# Patient Record
Sex: FEMALE | Employment: OTHER
[De-identification: names, ages, dates, MRNs, and addresses within clinical notes are randomized per-mention and may not be internally consistent; named-entity substitution may affect disease eponyms.]

---

## 2017-01-03 ENCOUNTER — HOSPITAL ENCOUNTER (OUTPATIENT)
Dept: PHYSICAL THERAPY | Age: 52
Setting detail: THERAPIES SERIES
Discharge: STILL ADMITTED OR ADMINISTRATIVE | End: 2017-01-03
Attending: NURSE PRACTITIONER

## 2017-01-03 DIAGNOSIS — R42 DIZZINESS: ICD-10-CM

## 2017-01-03 DIAGNOSIS — R26.89 UNSTABLE BALANCE: ICD-10-CM

## 2017-01-12 ENCOUNTER — HOSPITAL ENCOUNTER (OUTPATIENT)
Dept: PHYSICAL THERAPY | Age: 52
Setting detail: THERAPIES SERIES
Discharge: STILL ADMITTED OR ADMINISTRATIVE | End: 2017-01-12
Attending: NURSE PRACTITIONER

## 2017-01-12 DIAGNOSIS — R26.89 UNSTABLE BALANCE: ICD-10-CM

## 2017-01-12 DIAGNOSIS — R42 DIZZINESS: ICD-10-CM

## 2017-10-24 NOTE — TELEPHONE ENCOUNTER
"APPT INFO    Date /Time: 11/2/17 5:30pm   Reason for Appt: L Hip Pain   Ref Provider/Clinic: self   Patient Contact (Y/N) & Call Details: No - pt is returning to provider   Action: --     RECORDS CLINIC NAME  (\"None\" if no records ) RECEIVED RECS & IMG? Y/N   (may include other helpful notes)   Internal Clinics: [facility] Records and imaging in epic/pacs with Dr. Paiz. Pt was seen previously for RIGHT Hip.    Rt Hip Arthroscopy 1/3/11 in [facility] Records in [facility] with Dr. Dodd in 2013    [facility] PT notes in [facility]   External Clinics: none        "

## 2017-11-02 ENCOUNTER — PRE VISIT (OUTPATIENT)
Dept: ORTHOPEDICS | Facility: CLINIC | Age: 52
End: 2017-11-02

## 2017-12-05 ASSESSMENT — ENCOUNTER SYMPTOMS
MUSCLE WEAKNESS: 0
EYE IRRITATION: 1
ARTHRALGIAS: 0
STIFFNESS: 1
BACK PAIN: 1
EYE REDNESS: 1
EYE PAIN: 0
MUSCLE CRAMPS: 0
MYALGIAS: 1
JOINT SWELLING: 0
DOUBLE VISION: 0
EYE WATERING: 0
NECK PAIN: 0

## 2017-12-05 ASSESSMENT — ACTIVITIES OF DAILY LIVING (ADL)
ADL_SUM: 27
ADL_SUBSCALE_SCORE: 60.29
ADL_MEAN: 1.58

## 2017-12-05 ASSESSMENT — HOOS S4: HOW SEVERE IS YOUR HIP JOINT STIFFNESS AFTER FIRST WAKENING IN THE MORNING?: MODERATE

## 2017-12-08 DIAGNOSIS — M25.552 HIP PAIN, LEFT: Primary | ICD-10-CM

## 2017-12-14 ENCOUNTER — RADIANT APPOINTMENT (OUTPATIENT)
Dept: GENERAL RADIOLOGY | Facility: CLINIC | Age: 52
End: 2017-12-14
Attending: ORTHOPAEDIC SURGERY
Payer: COMMERCIAL

## 2017-12-14 ENCOUNTER — OFFICE VISIT (OUTPATIENT)
Dept: ORTHOPEDICS | Facility: CLINIC | Age: 52
End: 2017-12-14
Payer: COMMERCIAL

## 2017-12-14 VITALS — BODY MASS INDEX: 25.9 KG/M2 | HEIGHT: 67 IN | WEIGHT: 165.01 LBS

## 2017-12-14 DIAGNOSIS — M25.552 HIP PAIN, LEFT: ICD-10-CM

## 2017-12-14 DIAGNOSIS — M25.552 HIP PAIN, LEFT: Primary | ICD-10-CM

## 2017-12-14 RX ORDER — CITALOPRAM HYDROBROMIDE 20 MG/1
TABLET ORAL
COMMUNITY
Start: 2015-10-01

## 2017-12-14 NOTE — MR AVS SNAPSHOT
"              After Visit Summary   12/14/2017    Iman Lee    MRN: [id number]           Patient Information     Date Of Birth          1965        Visit Information        Provider Department      12/14/2017 1:30 PM Stas Paiz MD [facility]        Today's Diagnoses     Hip pain, left    -  1       Follow-ups after your visit        Who to contact     Please call your clinic at [phone] to:    Ask questions about your health    Make or cancel appointments    Discuss your medicines    Learn about your test results    Speak to your doctor   If you have compliments or concerns about an experience at your clinic, or if you wish to file a complaint, please contact [facility] Patient Relations at [phone] or email us at [email or web address]         Additional Information About Your Visit        MyChart Information     [facility] gives you secure access to your electronic health record. If you see a primary care provider, you can also send messages to your care team and make appointments. If you have questions, please call your primary care clinic.  If you do not have a primary care provider, please call [phone] and they will assist you.      [employer] is an electronic gateway that provides easy, online access to your medical records. With [employer], you can request a clinic appointment, read your test results, renew a prescription or communicate with your care team.     To access your existing account, please contact your [facility] or call [phone] for assistance.        Care EveryWhere ID     This is your Care EveryWhere ID. This could be used by other organizations to access your [facility] medical records  [id number]        Your Vitals Were     Height BMI (Body Mass Index)                1.702 m (5' 7\") 25.84 kg/m2           Blood Pressure from Last 3 Encounters:   No data found for BP    Weight from " Last 3 Encounters:   12/14/17 74.8 kg (165 lb 0.2 oz)   06/28/13 72.6 kg (160 lb)              Today, you had the following     No orders found for display       Primary Care Provider Office Phone # Fax #    Dayan Durham [phone]       [facility] [address]  [facility] [address]        Equal Access to Services     MARELY [facility] : Hadii aad ku hadasho Soomaali, waaxda luqadaha, qaybta kaalmada adeegyada, waxay idiin hayaan adeeg khseblesh laApaan . So [facility] [phone].    ATENCIÓN: Si habla espasde, tiene a dietz disposición servicios gratuitos de asistencia lingüística. Llame al [phone].    We comply with applicable federal civil rights laws and [place] laws. We do not discriminate on the basis of race, color, national origin, age, disability, sex, sexual orientation, or gender identity.            Thank you!     Thank you for choosing [facility]  for your care. Our goal is always to provide you with excellent care. Hearing back from our patients is one way we can continue to improve our services. Please take a few minutes to complete the written survey that you may receive in the mail after your visit with us. Thank you!             Your Updated Medication List - Protect others around you: Learn how to safely use, store and throw away your medicines at www.disposemymeds.org.          This list is accurate as of: 12/14/17  2:05 PM.  Always use your most recent med list.                   Brand Name Dispense Instructions for use Diagnosis    citalopram 20 MG tablet    celeXA          IBUPROFEN PO           traZODone 25 mg Tabs half-tab    DESYREL     Take 150 mg by mouth At Bedtime.

## 2017-12-14 NOTE — NURSING NOTE
[facility]  [address] [place] [id number]  Dept: [phone]  ______________________________________________________________________________    Patient: Iman Lee   : 1965   MRN: [id number]   2017    INVASIVE PROCEDURE SAFETY CHECKLIST    Date: 2017   Procedure: Left Hip Cortisone Injection  Patient Name: Iman Lee  MRN: [id number]  YOB: 1965    Action: Complete sections as appropriate. Any discrepancy results in a HARD COPY until resolved.     PRE PROCEDURE:  Patient ID verified with 2 identifiers (name and  or MRN): Yes  Procedure and site verified with patient/designee (when able): Yes  Accurate consent documentation in medical record: Yes  H&P (or appropriate assessment) documented in medical record: Yes  H&P must be up to 20 days prior to procedure and updates within 24 hours of procedure as applicable: Yes  Relevant diagnostic and radiology test results appropriately labeled and displayed as applicable: Yes  Procedure site(s) marked with provider initials: Yes    TIMEOUT:  Time-Out performed immediately prior to starting procedure, including verbal and active participation of all team members addressing the following:Yes  * Correct patient identify  * Confirmed that the correct side and site are marked  * An accurate procedure consent form  * Agreement on the procedure to be done  * Correct patient position  * Relevant images and results are properly labeled and appropriately displayed  * The need to administer antibiotics or fluids for irrigation purposes during the procedure as applicable   * Safety precautions based on patient history or medication use    DURING PROCEDURE: Verification of correct person, site, and procedures any time the responsibility for care of the patient is transferred to another member of the care team.         The following medication was given:     MEDICATION:  Kenalog 40  mg  ROUTE: IM  SITE: Left Hip  DOSE: 40mg, 1cc  LOT #: [id number]  : [employer]  EXPIRATION DATE: APR 2019  NDC#: 3561-2734-33   Was there drug waste? No      Emily Garner MA  December 14, 2017    The following medication was given:     MEDICATION:  Lidocaine without epinephrine  ROUTE: IM  SITE: Left Hip   DOSE: 6cc  LOT #: [id number]  : [employer]  EXPIRATION DATE: 09/21  NDC#: 91657-512-22  Was there drug waste? Yes  Amount of drug waste (mL): 14.  Reason for waste:  As per MD Emily Garner MA  December 14, 2017

## 2017-12-14 NOTE — PROGRESS NOTES
"Chief Complaint: Consult (Pt. states that she is here today for Left Hip Pain, started since Summer 2017. She mention that she did some excerise that had cause the pain. HX: Right Hip Arthroscopy. DOS: 01/03/2011.)     HPI: Iman Lee returns today to discuss her left hip. She is 6 years s/p labral repair and cam correction on the contra-lateral side. She reports that she is here because her left hip is\"really painful\" and has been for about 6 months. She reports difficulty with ADLs. She is taking ibuprofen, has changed her activities, and is doing a home exercise program. She associates this with doing \"sumo\" squats. Also with groin pain but currneltly not near the lateral symptoms in terms of severity.     Medications and allergies are documented in the EMR and have been reviewed.      Current Outpatient Prescriptions:      citalopram (CELEXA) 20 MG tablet, , Disp: , Rfl:      IBUPROFEN PO, , Disp: , Rfl:      traZODone (DESYREL) 25 MG TABS, Take 150 mg by mouth At Bedtime., Disp: , Rfl:     Allergies: Erythromycin; Iodine; Penicillins; and Percocet [oxycodone-acetaminophen]    Current Status:  Results of the patient s Hip Disability and Osteoarthritis Outcome Score (HOOS)  are as follows (0-100 scales with 100 being the theoretical best):  Pain:  60  Symptom:65  ADLS: 60.29  Sports: 31.25  Quality of life:   43.75  (http://koos.nu/)     Physical Exam:  On physical examination the patient appears the stated age, is in no acute distress, affect is appropriate, and breathing is non-labored.    Ht 1.702 m (5' 7\")  Wt 74.8 kg (165 lb 0.2 oz)  BMI 25.84 kg/m2  Body mass index is 25.84 kg/(m^2).    Gait: mild Trendelenburg on the left   Gains exam table: easily   ROM:    Abduction:   Adduction:  Flexion: 100  IRF: 10  ERF: 35  TTP at the greater trochanter and this reproduces her lateral symptoms  An anterior impingement test reproduces her groin pain-- her groin pain is currently a smaller percentage of her " "symptoms.     Distally, the circulatory, motor, and sensation exam is intact with 5/5 EHL, gastroc-soleus, and tibialis anterior.  Sensation to light touch is intact.  Dorsalis pedis and posterior tibialis pulses are palpable.  There are no sores on the feet, no bruising, and no lymphedema.    Assessment: two issues\": intra-articular hip and lateral hip at the greater trochanter. Similar to the pattern that she has been treated for on the right. Today her lateral symptoms are the worst and we discussed the options for his including physical therapy and injection.     Plan: left hip greater trochanter injection    Referred Self      Procedure Note:    After informed consent was obtained the patient's left greater trochanter bursae was injected with 5 cc of lidocaine and 40 mg of kenolog using a superior-medial approach.  Sterile technique was used.  The patient tolerated the procedure well.      Answers for HPI/ROS submitted by the patient on 12/5/2017   General Symptoms: No  Skin Symptoms: No  HENT Symptoms: No  EYE SYMPTOMS: Yes  HEART SYMPTOMS: No  LUNG SYMPTOMS: No  INTESTINAL SYMPTOMS: No  URINARY SYMPTOMS: No  GYNECOLOGIC SYMPTOMS: No  BREAST SYMPTOMS: No  SKELETAL SYMPTOMS: Yes  BLOOD SYMPTOMS: No  NERVOUS SYSTEM SYMPTOMS: No  MENTAL HEALTH SYMPTOMS: No  Eye pain: No  Vision loss: No  Dry eyes: Yes  Watery eyes: No  Eye bulging: No  Double vision: No  Spots: No  Floaters: No  Redness: Yes  Crossed eyes: No  Tunnel Vision: No  Yellowing of eyes: No  Eye irritation: Yes  Back pain: Yes  Muscle aches: Yes  Neck pain: No  Swollen joints: No  Joint pain: No  Bone pain: No  Muscle cramps: No  Muscle weakness: No  Joint stiffness: Yes  Bone fracture: No    "

## 2017-12-14 NOTE — NURSING NOTE
"Reason For Visit:   Chief Complaint   Patient presents with     Consult     Pt. states that she is here today for Left Hip Pain, started since Summer 2017. She mention that she did some excerise that had cause the pain. HX: Right Hip Arthroscopy. DOS: 01/03/2011.       Pain Assessment  Patient Currently in Pain: Yes  0-10 Pain Scale: 5  Primary Pain Location: Hip  Pain Orientation: Left  Pain Descriptors: Discomfort  Alleviating Factors: NSAIDS, Rest  Aggravating Factors: Movement, Walking, Standing               HEIGHT: 5' 7\", WEIGHT: 165 lbs .16 oz, BMI: Body mass index is 25.84 kg/(m^2).      Current Outpatient Prescriptions   Medication Sig Dispense Refill     citalopram (CELEXA) 20 MG tablet        IBUPROFEN PO        traZODone (DESYREL) 25 MG TABS Take 150 mg by mouth At Bedtime.            Allergies   Allergen Reactions     Erythromycin      Iodine      Penicillins      Percocet [Oxycodone-Acetaminophen]                "

## 2017-12-14 NOTE — LETTER
"12/14/2017       RE: Iman Lee  [address]  [place] [address]     Dear Colleague,    Thank you for referring your patient, Iman Lee, to the [facility] at [facility]. Please see a copy of my visit note below.    Chief Complaint: Consult (Pt. states that she is here today for Left Hip Pain, started since Summer 2017. She mention that she did some excerise that had cause the pain. HX: Right Hip Arthroscopy. DOS: 01/03/2011.)     HPI: Iman Lee returns today to discuss her left hip. She is 6 years s/p labral repair and cam correction on the contra-lateral side. She reports that she is here because her left hip is\"really painful\" and has been for about 6 months. She reports difficulty with ADLs. She is taking ibuprofen, has changed her activities, and is doing a home exercise program. She associates this with doing \"sumo\" squats. Also with groin pain but currneltly not near the lateral symptoms in terms of severity.     Medications and allergies are documented in the EMR and have been reviewed.      Current Outpatient Prescriptions:      citalopram (CELEXA) 20 MG tablet, , Disp: , Rfl:      IBUPROFEN PO, , Disp: , Rfl:      traZODone (DESYREL) 25 MG TABS, Take 150 mg by mouth At Bedtime., Disp: , Rfl:     Allergies: Erythromycin; Iodine; Penicillins; and Percocet [oxycodone-acetaminophen]    Current Status:  Results of the patient s Hip Disability and Osteoarthritis Outcome Score (HOOS)  are as follows (0-100 scales with 100 being the theoretical best):  Pain:  60  Symptom:65  ADLS: 60.29  Sports: 31.25  Quality of life:   43.75  (http://koos.nu/)     Physical Exam:  On physical examination the patient appears the stated age, is in no acute distress, affect is appropriate, and breathing is non-labored.    Ht 1.702 m (5' 7\")  Wt 74.8 kg (165 lb 0.2 oz)  BMI 25.84 kg/m2  Body mass index is 25.84 kg/(m^2).    Gait: mild " "Trendelenburg on the left   Gains exam table: easily   ROM:    Abduction:   Adduction:  Flexion: 100  IRF: 10  ERF: 35  TTP at the greater trochanter and this reproduces her lateral symptoms  An anterior impingement test reproduces her groin pain-- her groin pain is currently a smaller percentage of her symptoms.     Distally, the circulatory, motor, and sensation exam is intact with 5/5 EHL, gastroc-soleus, and tibialis anterior.  Sensation to light touch is intact.  Dorsalis pedis and posterior tibialis pulses are palpable.  There are no sores on the feet, no bruising, and no lymphedema.    Assessment: two issues\": intra-articular hip and lateral hip at the greater trochanter. Similar to the pattern that she has been treated for on the right. Today her lateral symptoms are the worst and we discussed the options for his including physical therapy and injection.     Plan: left hip greater trochanter injection    Referred Self      Procedure Note:    After informed consent was obtained the patient's left greater trochanter bursae was injected with 5 cc of lidocaine and 40 mg of kenolog using a superior-medial approach.  Sterile technique was used.  The patient tolerated the procedure well.    Sincerely,    Stas Paiz MD  "

## 2017-12-16 ENCOUNTER — HEALTH MAINTENANCE LETTER (OUTPATIENT)
Age: 52
End: 2017-12-16

## 2019-02-22 ENCOUNTER — THERAPY VISIT (OUTPATIENT)
Dept: PHYSICAL THERAPY | Facility: CLINIC | Age: 54
End: 2019-02-22
Payer: COMMERCIAL

## 2019-02-22 DIAGNOSIS — M99.05 SOMATIC DYSFUNCTION OF PELVIC REGION: ICD-10-CM

## 2019-02-22 DIAGNOSIS — N81.11 CYSTOCELE, MIDLINE: ICD-10-CM

## 2019-02-22 PROCEDURE — 97161 PT EVAL LOW COMPLEX 20 MIN: CPT | Mod: GP | Performed by: PHYSICAL THERAPIST

## 2019-02-22 PROCEDURE — 97112 NEUROMUSCULAR REEDUCATION: CPT | Mod: GP | Performed by: PHYSICAL THERAPIST

## 2019-02-22 PROCEDURE — 97530 THERAPEUTIC ACTIVITIES: CPT | Mod: GP | Performed by: PHYSICAL THERAPIST

## 2019-02-22 NOTE — PROGRESS NOTES
[facility] [place]  System  Physical Exam  General   [facility]        Physical Therapy Initial Examination/Evaluation 2019   Iman Lee is a 53 year old female referred to physical therapy by Dr. Ibis Solorio for treatment of cystocele with Precautions/Restrictions/MD instructions E&T   Therapist Assessment:   Clinical Impression: Pt presents to [facility] with primary complaint of prolapse.  Per clinical examination, pt with decreased tone in pelvic floor and bulge palpated with cough or valsalva.  Pt has been working with PT/fay and has improving core and proximal mm strength.  Pt will benefit from skilled physical therapy to improve control and strength and pelvic floor.       Subjective: Pt reports that prolapse has been present since . She saw Dr. Solorio in November and feels that prolapse worsened but since maybe has improved. Pt feels prolapse daily.   DOI/onset: MD Order 2019   Mechanism of injury: childbirth    DOS NA   Related PMH: L SI joint  Previous treatment: sees PT/fay   Imaging: NA   Chief Complaint: Prolapse   Pain: rest 0 /10, activity 0/10  Described as: NA Alleviated by: NA Exacerbated by: NA Progression of symptoms since initial onset: Staying the same or worsening Time of day when pain is worse: Unsure maybe more in the morning    Sleepin   Social history: 3 kids ages 25, 23, 21   Occupation:  Job duties: Computer work, driving, lifting/carrying, prolonged sitting   Current HEP/exercise regimen: [place] method, biking, walking; works with retired PT/fay    Patient's goals are Help with prolapsed wall    Other pertinent PMH: Concussions/dizziness, depression, MS General health as reported by patient: Good    Return to MD: prn      Urination:  Do you leak on the way to the bathroom or with a strong urge to void? No    Do you leak with cough,sneeze, jumping, running?No   Any other activities that cause leaking? No   Do you have triggers that make  you feel you can't wait to go to the bathroom? No   Type of pad and number used per day?  0  When you leak what is the amount? NA    How long can you delay the need to urinate? 10 mins   How many times do you get up to urinate at night? 0    Can you stop the flow of urine when on the toilet? Yes  Is the volume of urine passed usually: average. (8sec rule=  250ml with average bladder storing  400-600ml)    Do you strain to pass urine? No  Do you have a slow or hesitant urinary stream? No  Do you have difficulty initiating the urine stream? No  Is urination painful?  No    How many bladder infections have you had in last 12 months? 0    Fluid intake(one glass is 8oz or one cup) 4 glasses/day, 2 caffinated glasses/day  1 alcohol glasses/day.    Bowel habits:  Frequency of bowel movements? 1 times a day  Consistancy of stool? soft formed  Do you ignore the urge to defecate? No  Do you strain to pass stool? No    Pelvic Pain:  Do you have any pelvic pain with intercourse, exams, use of tampons? No  Is initial penetration during intercourse painful? Yes  Is deeper penetration painful? No  Do you use lubricant?   Yes What kind? [place]      Given birth? Yes Any complications? Tearing  # of vaginal delieveries? 3   # of C-sections? 0  # of episiotomies? 0  Are you sexually active?Yes  Have you ever been worried for your physical safety? No  Any abdominal or pelvic surgeries?  Yes, gallbladder  Are you having any regular exercise? Yes  Have you practiced the PF(kegel) exercises for 4 or more weeks?Yes    MUSCLE PERFORMANCE    Baseline PF tone:hypo  PF Tone with cough: bulge and hypo  Valsalva: bulge  PF Response quality: moderate  PF Power: Center: 2   Endurance: Maximum contraction in seconds: 8  # of endurance contractions before fatigue: NT  Quick contraction repetitions prior to fatigue: 7-8.  Specificity/accessory muscles: Abdominals  Prolapse: Cyctocele/Rectocele/Uterine grade: I-II with cough and valsalva    2/22/2019  mIan  Left Right    Hip Flexion  4+/5  4+/5   Hip Abduction  4/5 4/5   Hip Extension  4+/5 4+/5           PALPATION: Non-tender all superficial structures with digital evaluation    Therapeutic Activity (15 min): Today's session consisted of education regarding pelvic floor muscle anatomy, normal bladder function, urge suppression techniques and/or relaxation techniques as indicated.  Pt was instructed in the pathoanatomy of the pelvic floor utilizing pelvic model.  We discussed what pelvic floor physical therapy is, components of exam, and typical patient progression.  Pt was told that she was in control of exam progression, and if at any time was uncomfortable and wished to discontinue we could.     NMR (25 mins)  Biofeedback unit used to provide visualization of PF activation in multiple body positions including supine, sidelying, & sitting. Education provided on building strength for power & endurance in pt's PF due to pt's low resting tone and symptoms. Pt working on 2s contractions, 4s rest,  10 reps 2-3 x/day. Trialled contractions in both supine and sitting. Pt provided with HEP.      Assessment/Plan:    Patient is a 53 year old female with pelvic complaints.    Patient has the following significant findings with corresponding treatment plan.                Diagnosis 1:  Cystocele  Decreased strength - therapeutic exercise, therapeutic activities and home program  Impaired muscle performance - biofeedback, electric stimulation, neuro re-education and home program  Decreased function - therapeutic activities and home program    Therapy Evaluation Codes:   1) History comprised of:   Personal factors that impact the plan of care:      Past/current experiences and Time since onset of symptoms.    Comorbidity factors that impact the plan of care are:      Concussion, Depression and Multiple sclerosis.     Medications impacting care: Anti-depressant and Sleep.  2) Examination of Body Systems comprised  of:   Body structures and functions that impact the plan of care:      Pelvis.   Activity limitations that impact the plan of care are:      [place], Urgency and Prolapse.  3) Clinical presentation characteristics are:   Stable/Uncomplicated.  4) Decision-Making    Low complexity using standardized patient assessment instrument and/or measureable assessment of functional outcome.  Cumulative Therapy Evaluation is: Low complexity.    Previous and current functional limitations:  (See Goal Flow Sheet for this information)    Short term and Long term goals: (See Goal Flow Sheet for this information)     Communication ability:  Patient appears to be able to clearly communicate and understand verbal and written communication and follow directions correctly.  Treatment Explanation - The following has been discussed with the patient:   RX ordered/plan of care  Anticipated outcomes  Possible risks and side effects  This patient would benefit from PT intervention to resume normal activities.   Rehab potential is good.    Frequency:  1 X week, once daily  Duration:  for 6 weeks  Discharge Plan:  Achieve all LTG.  Independent in home treatment program.  Reach maximal therapeutic benefit.    Please refer to the daily flowsheet for treatment today, total treatment time and time spent performing 1:1 timed codes.

## 2019-02-24 PROBLEM — N81.11 CYSTOCELE, MIDLINE: Status: ACTIVE | Noted: 2019-02-24

## 2019-02-24 PROBLEM — M99.05 SOMATIC DYSFUNCTION OF PELVIC REGION: Status: ACTIVE | Noted: 2019-02-24

## 2019-03-08 ENCOUNTER — THERAPY VISIT (OUTPATIENT)
Dept: PHYSICAL THERAPY | Facility: CLINIC | Age: 54
End: 2019-03-08
Payer: COMMERCIAL

## 2019-03-08 DIAGNOSIS — N81.11 CYSTOCELE, MIDLINE: ICD-10-CM

## 2019-03-08 DIAGNOSIS — M99.05 SOMATIC DYSFUNCTION OF PELVIC REGION: ICD-10-CM

## 2019-03-08 PROCEDURE — 97110 THERAPEUTIC EXERCISES: CPT | Mod: GP | Performed by: PHYSICAL THERAPIST

## 2019-03-08 PROCEDURE — 97112 NEUROMUSCULAR REEDUCATION: CPT | Mod: GP | Performed by: PHYSICAL THERAPIST

## 2019-03-26 ENCOUNTER — MEDICAL CORRESPONDENCE (OUTPATIENT)
Dept: HEALTH INFORMATION MANAGEMENT | Facility: CLINIC | Age: 54
End: 2019-03-26

## 2019-04-09 ENCOUNTER — THERAPY VISIT (OUTPATIENT)
Dept: PHYSICAL THERAPY | Facility: CLINIC | Age: 54
End: 2019-04-09
Payer: COMMERCIAL

## 2019-04-09 DIAGNOSIS — M99.05 SOMATIC DYSFUNCTION OF PELVIC REGION: ICD-10-CM

## 2019-04-09 DIAGNOSIS — N81.11 CYSTOCELE, MIDLINE: ICD-10-CM

## 2019-04-09 PROCEDURE — 97110 THERAPEUTIC EXERCISES: CPT | Mod: GP | Performed by: PHYSICAL THERAPIST

## 2019-04-09 PROCEDURE — 97112 NEUROMUSCULAR REEDUCATION: CPT | Mod: GP | Performed by: PHYSICAL THERAPIST

## 2019-04-11 NOTE — PROGRESS NOTES
[facility]  System  Physical Exam  General   ROS    PROGRESS  REPORT    Progress reporting period is from 02/22/2019 to 04/09/2019.       SUBJECTIVE  Subjective: Pt has been traveling over the last month so has lost some ground. She does feel like she has improved though and is feeling prolapse less frequently    Current pain level is: 0/10  Initial Pain level: 0/10.   Changes in function:  Yes (See Goal flowsheet attached for changes in current functional level)  Adverse reaction to treatment or activity: None    OBJECTIVE  Changes noted in objective findings:  Yes    MUSCLE PERFORMANCE 4/9/2019     Baseline PF tone: hypo  PF Tone with cough: slight bulge but improved with contraction prior  Valsalva: NT  PF Response quality: moderate to normal   PF Power: Center: 2-3/5  Endurance: Maximum contraction in seconds:  10+ seconds  # of endurance contractions before fatigue:   Quick contraction repetitions prior to fatigue: 10+  Specificity/accessory muscles: improved isolation   Prolapse: Cyctocele/Rectocele/Uterine grade: cystocele with cough    MUSCLE PERFORMANCE 2/22/2019     Baseline PF tone:hypo  PF Tone with cough: bulge and hypo  Valsalva: bulge  PF Response quality: moderate  PF Power: Center: 2   Endurance: Maximum contraction in seconds: 8  # of endurance contractions before fatigue: NT  Quick contraction repetitions prior to fatigue: 7-8.  Specificity/accessory muscles: Abdominals  Prolapse: Cyctocele/Rectocele/Uterine grade: I-II with cough and valsalva      Objective: Reviewed and progressed HEP as appropriate. Improved strength and control noted in pelvic floor     ASSESSMENT/PLAN  Updated problem list and treatment plan: Diagnosis 1:  Cystocele  Decreased strength - therapeutic exercise, therapeutic activities and home program  Impaired muscle performance - electric stimulation, neuro re-education and home program  Decreased function - therapeutic activities and home program  STG/LTGs have been met or  progress has been made towards goals:  Yes (See Goal flow sheet completed today.)  Assessment of Progress: The patient's condition is improving.  Self Management Plans:  Patient has been instructed in a home treatment program.  I have re-evaluated this patient and find that the nature, scope, duration and intensity of the therapy is appropriate for the medical condition of the patient.  Iman continues to require the following intervention to meet STG and LTG's:  PT    Recommendations:  This patient would benefit from continued therapy.     Frequency:  1 X a month, once daily  Duration:  for 2 months        Please refer to the daily flowsheet for treatment today, total treatment time and time spent performing 1:1 timed codes.

## 2019-05-09 ENCOUNTER — TELEPHONE (OUTPATIENT)
Dept: GASTROENTEROLOGY | Facility: CLINIC | Age: 54
End: 2019-05-09

## 2019-05-09 DIAGNOSIS — Z12.11 SPECIAL SCREENING FOR MALIGNANT NEOPLASMS, COLON: Primary | ICD-10-CM

## 2019-05-09 NOTE — TELEPHONE ENCOUNTER
Referring MD Richie Connolly  Screening  Colonoscopy    ? no    Arrival time verified? 10 am May 15th    Facility location verified? [address]; 1st floor    Instructions given regarding prep and procedure    Prep Type Golytely script sent to [facility] in [employer] in [place], [place]    Are you taking any anticoagulants or blood thinners? no    Instructions given? Yes, mailed    Electronic implanted devices? no    Pre procedure teaching completed? Yes    Transportation from procedure? Yes, verbalized understanding of expectations of designated  and  6 hours after exam    H&P / Pre op physical completed? n/a

## 2019-05-15 ENCOUNTER — HOSPITAL ENCOUNTER (OUTPATIENT)
Facility: CLINIC | Age: 54
Discharge: HOME | End: 2019-05-15
Attending: INTERNAL MEDICINE | Admitting: INTERNAL MEDICINE
Payer: COMMERCIAL

## 2019-05-15 VITALS
RESPIRATION RATE: 13 BRPM | HEIGHT: 67 IN | OXYGEN SATURATION: 99 % | SYSTOLIC BLOOD PRESSURE: 137 MMHG | DIASTOLIC BLOOD PRESSURE: 122 MMHG | HEART RATE: 75 BPM | WEIGHT: 165 LBS | BODY MASS INDEX: 25.9 KG/M2

## 2019-05-15 PROCEDURE — 45378 DIAGNOSTIC COLONOSCOPY: CPT | Performed by: INTERNAL MEDICINE

## 2019-05-15 PROCEDURE — 25000128 H RX IP 250 OP 636: Performed by: INTERNAL MEDICINE

## 2019-05-15 PROCEDURE — G0121 COLON CA SCRN NOT HI RSK IND: HCPCS | Performed by: INTERNAL MEDICINE

## 2019-05-15 PROCEDURE — G0500 MOD SEDAT ENDO SERVICE >5YRS: HCPCS | Performed by: INTERNAL MEDICINE

## 2019-05-15 PROCEDURE — 99153 MOD SED SAME PHYS/QHP EA: CPT | Performed by: INTERNAL MEDICINE

## 2019-05-15 RX ORDER — ONDANSETRON 2 MG/ML
4 INJECTION INTRAMUSCULAR; INTRAVENOUS
Status: CANCELLED | OUTPATIENT
Start: 2019-05-15

## 2019-05-15 RX ORDER — LIDOCAINE 40 MG/G
CREAM TOPICAL
Status: CANCELLED | OUTPATIENT
Start: 2019-05-15

## 2019-05-15 RX ORDER — FENTANYL CITRATE 50 UG/ML
INJECTION, SOLUTION INTRAMUSCULAR; INTRAVENOUS PRN
Status: DISCONTINUED | OUTPATIENT
Start: 2019-05-15 | End: 2019-05-15 | Stop reason: HOSPADM

## 2019-05-15 ASSESSMENT — MIFFLIN-ST. JEOR: SCORE: 1386.07

## 2019-05-15 NOTE — OR NURSING
Procedure: Colonoscopy with no interventions.   Sedation: Concsious  O2: 2L  Tolerated Procedure: WEll  Patient returned to recovery room in stable condition with DANNY Stout RN

## 2019-05-15 NOTE — DISCHARGE INSTRUCTIONS
Discharge Instructions after Colonoscopy    Today you had a Colonoscopy    Activity and Diet  You were given medicine for pain. You may be dizzy or sleepy.  For 24 hours:    Do not drive or use heavy equipment.    Do not make important decisions.    Do not drink any alcohol.  You may return to your normal diet and medicines.    Discomfort    Air was placed in your colon during the exam in order to see it. Walking helps to pass the air.    You may take Tylenol (acetaminophen) for pain unless your doctor has told you not to.    Follow-up  Per your physician.    When to call:    Call right away if you have:    Unusual pain in belly or chest pain not relieved with passing air.    More than 1 to 2 Tablespoons of bleeding from your rectum.    Fever above 100.6  F (37.5  C).    If you have severe pain, bleeding, or shortness of breath, go to an emergency room.    If you have questions, call:  Monday to Friday, 7 a.m. to 4:30 p.m.  Endoscopy: [phone] (We may have to call you back)    After hours  Hospital: [phone] (Ask for the GI fellow on call)

## 2019-05-21 ENCOUNTER — TELEPHONE (OUTPATIENT)
Dept: GASTROENTEROLOGY | Facility: CLINIC | Age: 54
End: 2019-05-21

## 2019-05-21 NOTE — TELEPHONE ENCOUNTER
[facility] RN Care Coordination Note:    Called and spoke with patient and informed her that she could see any provider in the general GI clinic who sees IBS, informed her that Dr. Diop is a pancreatic specialist. Informed her that the GI referral would need to come from the provider who ordered her colonoscopy or her PCP as it would not be Dr. Diop who orders the consult. She verbalized understanding and had no further questions.     Courtney Sears RN   Care Coordinator

## 2019-05-21 NOTE — TELEPHONE ENCOUNTER
Pt called to request a copy of Dr. Diop's 5/15/19 ProVation report as well to discuss a clinic visit with his nurse.  The telephone number provided for his clinic as well as call transfer.    Request made of Mary Lou Brothers, Endoscopy, for mailing ProVation report to patient if it has not already been mailed.     Marjorie Emmanuel RN  [facility] Endoscopy

## 2019-05-21 NOTE — TELEPHONE ENCOUNTER
[facility] Call Center    Phone Message    May a detailed message be left on voicemail: yes    Reason for Call: Other: Pt calling, was told by Dr. Diop she should schedule an appointment with someone who specializes in IBS but she is unsure who that is and I'm unable to find anything relating to this in her chart. Could someone follow up with patient to help her?     Action Taken: Message routed to:  [facility] Panc

## 2019-05-24 LAB — COLONOSCOPY: NORMAL

## 2019-12-03 ENCOUNTER — MEDICAL CORRESPONDENCE (OUTPATIENT)
Dept: HEALTH INFORMATION MANAGEMENT | Facility: CLINIC | Age: 54
End: 2019-12-03

## 2019-12-13 PROBLEM — M99.05 SOMATIC DYSFUNCTION OF PELVIC REGION: Status: RESOLVED | Noted: 2019-02-24 | Resolved: 2019-04-09

## 2019-12-13 PROBLEM — N81.11 CYSTOCELE, MIDLINE: Status: RESOLVED | Noted: 2019-02-24 | Resolved: 2019-04-09

## 2020-10-23 ENCOUNTER — TELEPHONE (OUTPATIENT)
Dept: GASTROENTEROLOGY | Facility: CLINIC | Age: 55
End: 2020-10-23

## 2020-10-23 NOTE — TELEPHONE ENCOUNTER
[facility] Call Center    Phone Message    May a detailed message be left on voicemail: yes     Reason for Call: Other: Pt calling in to see Dr. Case. Pt does not have C.Diff, but she is interested in provider due to her study of him and his functional treatments. Pt is wanting to know if ther would be any other providers that were familiar with his treatment and no much standard medicine. Please follow up when available. THank you      Action Taken: Message routed to:  [facility] Gastro    Travel Screening: Not Applicable

## 2020-10-23 NOTE — TELEPHONE ENCOUNTER
Patient states she was referred by [facility] Dr. Hidalgo  Do not see referral in chart.   Pt had colonoscopy perforemd by  Dr. Diop in  May 2019   Pt will contact referring provider to sent referral  Pt asking for how to treat diverticulitis  informed her would need to have visit  Pt in agreement  Scheduled with Dr. Zimmerman on Novemb 10 at 3 pm.

## 2020-10-26 NOTE — TELEPHONE ENCOUNTER
REFERRAL INFORMATION:    Referring Provider:  Dr. Hidalgo     Referring Clinic:  [facility]     Reason for Visit/Diagnosis: Diverticulitis      FUTURE VISIT INFORMATION:    Appointment Date: 11/10/2020    Appointment Time: 3 PM      NOTES STATUS DETAILS   OFFICE NOTE from Referring Provider Care Everywhere 8/5/2020 Office visit with Dr. Hidalgo    OFFICE NOTE from Other Specialist Care Everywhere 12/3/19 Office visit with SAAD Ferrer ([facility])    HOSPITAL DISCHARGE SUMMARY/  ED VISITS N/A    OPERATIVE REPORT N/A    MEDICATION LIST Care Everywhere         ENDOSCOPY  N/A    COLONOSCOPY Internal 5/15/19   ERCP N/A    EUS N/A    STOOL TESTING N/A    PERTINENT LABS Care Everywhere    PATHOLOGY REPORTS (RELATED) N/A    IMAGING (CT, MRI, EGD, MRCP, Small Bowel Follow Through/SBT, MR/CT Enterography) N/A

## 2020-11-10 ENCOUNTER — PRE VISIT (OUTPATIENT)
Dept: GASTROENTEROLOGY | Facility: CLINIC | Age: 55
End: 2020-11-10

## 2020-11-10 ENCOUNTER — VIRTUAL VISIT (OUTPATIENT)
Dept: GASTROENTEROLOGY | Facility: CLINIC | Age: 55
End: 2020-11-10
Payer: COMMERCIAL

## 2020-11-10 VITALS — HEIGHT: 68 IN | BODY MASS INDEX: 25.01 KG/M2 | WEIGHT: 165 LBS

## 2020-11-10 DIAGNOSIS — K57.30 DIVERTICULOSIS OF LARGE INTESTINE WITHOUT HEMORRHAGE: ICD-10-CM

## 2020-11-10 DIAGNOSIS — K58.8 OTHER IRRITABLE BOWEL SYNDROME: Primary | ICD-10-CM

## 2020-11-10 PROCEDURE — 99204 OFFICE O/P NEW MOD 45 MIN: CPT | Mod: GT | Performed by: INTERNAL MEDICINE

## 2020-11-10 RX ORDER — DICYCLOMINE HYDROCHLORIDE 10 MG/1
10 CAPSULE ORAL 4 TIMES DAILY PRN
Qty: 240 CAPSULE | Refills: 3 | Status: SHIPPED | OUTPATIENT
Start: 2020-11-10 | End: 2021-01-07

## 2020-11-10 RX ORDER — MULTIPLE VITAMINS W/ MINERALS TAB 9MG-400MCG
1 TAB ORAL
COMMUNITY
Start: 2019-03-26

## 2020-11-10 RX ORDER — CETIRIZINE HYDROCHLORIDE 10 MG/1
10 TABLET ORAL
COMMUNITY

## 2020-11-10 RX ORDER — ALBUTEROL SULFATE 90 UG/1
AEROSOL, METERED RESPIRATORY (INHALATION)
COMMUNITY
Start: 2020-04-15

## 2020-11-10 RX ORDER — ESTRADIOL 10 UG/1
INSERT VAGINAL
COMMUNITY
Start: 2020-08-06 | End: 2023-05-04

## 2020-11-10 ASSESSMENT — MIFFLIN-ST. JEOR: SCORE: 1396.94

## 2020-11-10 ASSESSMENT — PAIN SCALES - GENERAL: PAINLEVEL: NO PAIN (0)

## 2020-11-10 NOTE — PROGRESS NOTES
GI CLINIC VISIT    CC/REFERRING MD:  No ref. provider found  REASON FOR CONSULTATION:   No ref. provider found for   Chief Complaint   Patient presents with     Consult     New appointment.       ASSESSMENT/PLAN:  Iman is a pleasant 55-year-old female who is here today to discuss several year history of intermittent sharp left lower quadrant abdominal discomfort.    1.  Intermittent and self-limited episodes of sharp left lower quadrant abdominal discomfort.  Her history seems quite consistent with uncomplicated diverticulitis.  I did discuss this in detail with Iman today.  Though her symptoms are consistent with this we have never had a CT scan done to confirm that she is actually having diverticulitis.  The differential diagnosis also includes irritable bowel syndrome with colon spasms, constipation, and other less common etiologies such as segmental colitis associated with diverticulitis (scad).  Given what she is describing I think she likely has some intermittent mild cases of uncomplicated diverticulitis.    Moving forward I think there are some things we can do to help minimize these episodes and also shorten their duration when they do occur.  I have asked her to start Citrucel fiber powder.  She will take 1 tablespoon/day and slowly titrate up to 1 tablespoon 3 times per day.  When she does feel an episode coming on I recommend that she transition to a liquid diet for 48 hours.  At the same time I recommend she start MiraLAX 1-2 capfuls a day.  After 48 hours and when she is feeling better she can slowly expand her diet.  I will also give her prescription for dicyclomine, which is an antispasmodic that she can take as needed for these episodes.    Finally, the next time she does have 1 of these episodes she will contact the clinic and we will schedule a CT scan to be done to see if we can confirm the presence of diverticulitis.    At this point I believe there is no indication for antibiotic  therapy.    If we do confirm that she has diverticulitis and conservative therapies do not minimize the recurrence then we can consider referral to colorectal surgeon to discuss partial colectomy.    I discussed the plan with Iman and she agrees with our course of action.  All questions were answered to her stated satisfaction.    Thank you very much for the opportunity to take part in the case patient please not hesitate to call for questions.        RTC 3 months    Thank you for this consultation.  It was a pleasure to participate in the care of this patient; please contact us with any further questions.     This note was created with voice recognition software, and while reviewed for accuracy, typos may remain.     Husam Zimmerman MD  Inflammatory Bowel Disease Program  Division of Gastroenterology, Hepatology and Nutrition  [facility]  Pager: [phone]      HPI:    Ms. Lee is a pleasant 55-year-old female with a past medical history of irritable bowel syndrome is here today to discuss intermittent episodes of left lower quadrant abdominal pain that she believes may be due to diverticulitis.    She reports that a few years ago she developed left lower quadrant abdominal discomfort.  She was given the clinical diagnosis of diverticulitis as well as the diagnosis of irritable bowel syndrome.  She does not believe she has ever undergone a CT scan to confirm diverticulitis and I can find no evidence of this in her epic chart or Care Everywhere.    She reports that ever since this time that she will have 3-4 episodes of this left lower quadrant abdominal pain per year.  Most recently this happened in July.  She reports that when she gets this episode it will begin with some pain near her umbilicus.  She will then developed sharp left lower quadrant abdominal pain.  This will be most severe for the first 24 hours.  The pain then becomes dull and fades over the subsequent 4 days.  She has found that if  "she stops eating for a day or 2 this can help abort or limit the discomfort.  She also notes associated constipation during these episodes but denies any significant constipation at other times.  She never uses any laxatives and she has never used an antispasmodic.  She does not use any fiber.    It does not seem like she is ever seen a gastroenterologist and has not had any other therapies for irritable bowel syndrome.  She reports that in general she has 1 bowel movement per day.  This bowel movement is formed and there is no blood in the stool.  She will sometimes have looser bowel movements but there is no associated abdominal discomfort.  These looser bowel movements are what she terms \"irritable bowel syndrome.\"    She did have a colonoscopy in 2019.  The terminal ileum was normal.  There were multiple small and large mouth diverticuli in the sigmoid colon and transverse colon.  There was no evidence of inflammation or bleeding.    ROS:    No fevers or chills  No weight loss  No blurry vision, double vision or change in vision  No sore throat  No lymphadenopathy  No headache, paraesthesias, or weakness in a limb  No shortness of breath or wheezing  No chest pain or pressure  No arthralgias or myalgias  No rashes or skin changes  No odynophagia or dysphagia  No BRBPR, hematochezia, melena  No dysuria, frequency or urgency  No hot/cold intolerance or polyria  No anxiety or depression    PREVIOUS ENDOSCOPY:  Colonoscopy as above    PERTINENT RELEVANT IMAGING OR LABS:  In August of this year she had a CBC, LFTs, BMP, ESR, CRP and all of these were normal.  She is also had a TTG that is normal.    ALLERGIES:     Allergies   Allergen Reactions     Erythromycin      Iodine      Penicillins      Percocet [Oxycodone-Acetaminophen]      Latex Rash       PERTINENT MEDICATIONS:    Current Outpatient Medications:      albuterol (PROAIR HFA/PROVENTIL HFA/VENTOLIN HFA) 108 (90 Base) MCG/ACT inhaler, INHALE 8 PUFFS PER " EMERGENCY ACTION PLAN. USE WITH SPACER., Disp: , Rfl:      cetirizine (ZYRTEC) 10 MG tablet, Take 10 mg by mouth, Disp: , Rfl:      citalopram (CELEXA) 20 MG tablet, , Disp: , Rfl:      estradiol (VAGIFEM) 10 MCG TABS vaginal tablet, INSERT 1 TABLET INTO THE VAGINA EVERY MONDAY AND THURSDAY., Disp: , Rfl:      IBUPROFEN PO, , Disp: , Rfl:      multivitamin w/minerals (THERA-VIT-M) tablet, Take 1 tablet by mouth, Disp: , Rfl:      traZODone (DESYREL) 25 MG TABS, Take 150 mg by mouth At Bedtime., Disp: , Rfl:     PROBLEM LIST  Patient Active Problem List    Diagnosis Date Noted     Hip joint pain 01/05/2011     Priority: Medium     Abnormal gait 01/05/2011     Priority: Medium     Hip pain 09/27/2010     Priority: Medium     Nonallopathic lesion of sacral region 07/06/2007     Priority: Medium     Problem list name updated by automated process. Provider to review       iamENTHESOPATHY OF HIP 06/19/2007     Priority: Medium     Nonallopathic lesion of lower extremities 06/19/2007     Priority: Medium     Problem list name updated by automated process. Provider to review         PERTINENT PAST MEDICAL HISTORY:  Past Medical History:   Diagnosis Date     Depressive disorder      Diverticulitis      IBS (irritable bowel syndrome)      Scoliosis        PREVIOUS SURGERIES:  Past Surgical History:   Procedure Laterality Date     C HAND/FINGER SURGERY UNLISTED  2007    Right thumb     C PELVIS/HIP JOINT SURGERY UNLISTED  2010    Right hip     C STOMACH SURGERY PROCEDURE UNLISTED  2014    Abdominal hernia     CHOLECYSTECTOMY       COLONOSCOPY N/A 5/15/2019    Procedure: COLONOSCOPY;  Surgeon: Danny Diop MD;  Location:  GI       SOCIAL HISTORY:  Social History     Socioeconomic History     Marital status:      Spouse name: Not on file     Number of children: Not on file     Years of education: Not on file     Highest education level: Not on file   Occupational History     Not on file   Social Needs      "Financial resource strain: Not on file     Food insecurity     Worry: Not on file     Inability: Not on file     Transportation needs     Medical: Not on file     Non-medical: Not on file   Tobacco Use     Smoking status: Never Smoker     Smokeless tobacco: Never Used   Substance and Sexual Activity     Alcohol use: Yes     Comment: 1 glass 3x4 times a week     Drug use: No     Sexual activity: Yes     Partners: Male     Birth control/protection: Female Surgical   Lifestyle     Physical activity     Days per week: Not on file     Minutes per session: Not on file     Stress: Not on file   Relationships     Social connections     Talks on phone: Not on file     Gets together: Not on file     Attends Cheondoism service: Not on file     Active member of club or organization: Not on file     Attends meetings of clubs or organizations: Not on file     Relationship status: Not on file     Intimate partner violence     Fear of current or ex partner: Not on file     Emotionally abused: Not on file     Physically abused: Not on file     Forced sexual activity: Not on file   Other Topics Concern     Parent/sibling w/ CABG, MI or angioplasty before 65F 55M? Not Asked   Social History Narrative     Not on file       FAMILY HISTORY:  Family History   Problem Relation Age of Onset     Hypertension Mother      Osteoporosis Mother      Hypertension Father      Hyperlipidemia Father        Past/family/social history reviewed and no changes    PHYSICAL EXAMINATION:  Constitutional: aaox3, cooperative, pleasant, not dyspneic/diaphoretic, no acute distress  Vitals reviewed: Ht 1.727 m (5' 8\")   Wt 74.8 kg (165 lb)   BMI 25.09 kg/m    Wt:   Wt Readings from Last 2 Encounters:   11/10/20 74.8 kg (165 lb)   05/15/19 74.8 kg (165 lb)      Eyes: Sclera anicteric/injected  Respiratory: Unlabored breathing  Skin: no jaundice  Psych: Normal affect            "

## 2020-11-10 NOTE — NURSING NOTE
"Chief Complaint   Patient presents with     Consult     New appointment.       Vitals:    11/10/20 1414   Weight: 74.8 kg (165 lb)   Height: 1.727 m (5' 8\")       Body mass index is 25.09 kg/m .                            JAMES FRANCO, EMT            "

## 2020-11-10 NOTE — PATIENT INSTRUCTIONS
It was a pleasure meeting you today at your virtual visit.  I have outlined the recommendations that we discussed today.    Please start Citrucel fiber powder.  Take 1 tablespoon daily in a glass of water for 7 days.  After 7 days increase to 1 tablespoon twice daily in a glass of water.  After 7 days increase up to 1 tablespoon 3 times a day and a glass of water.    Please drink 64 ounces of water per day.    If you develop an episode of abdominal pain please let our clinic know and we will schedule a CT scan to see if there is any evidence of diverticulitis.    When you develop your episode of abdominal pain please transition to a liquid diet and continue this for 48 hours.  After you are feeling better you can expand your diet slowly.    When you develop your episode of abdominal pain please take the dicyclomine pill as needed.  I have sent this to your pharmacy.  This is an antispasm pill that can help with the discomfort.    Also, when you develop your episode of abdominal pain please start taking MiraLAX 1-2 capfuls a day.  This is a laxative that can help with the constipation that you experienced during these episodes.    Finally, when you do have your episode of abdominal discomfort I would hold the fiber supplement for a couple days until you are feeling better.    My clinic will contact you to schedule a follow-up visit in 3 months.    Please call with questions.

## 2020-11-10 NOTE — PROGRESS NOTES
"Iman Lee is a 55 year old female who is being evaluated via a billable video visit.      The patient has been notified of following:     \"This video visit will be conducted via a call between you and your physician/provider. We have found that certain health care needs can be provided without the need for an in-person physical exam.  This service lets us provide the care you need with a video conversation.  If a prescription is necessary we can send it directly to your pharmacy.  If lab work is needed we can place an order for that and you can then stop by our lab to have the test done at a later time.    Video visits are billed at different rates depending on your insurance coverage.  Please reach out to your insurance provider with any questions.    If during the course of the call the physician/provider feels a video visit is not appropriate, you will not be charged for this service.\"    Patient has given verbal consent for Video visit? Yes  How would you like to obtain your AVS? MyChart  If you are dropped from the video visit, the video invite should be resent to: Text to cell phone: [phone]  Will anyone else be joining your video visit? No      During this virtual visit the patient is located in [place], patient verifies this as the location during the entirety of this visit.   Video-Visit Details    Type of service:  Video Visit    Video Start Time: 2:59PM  Video End Time: 3:15PM    Originating Location (pt. Location): Home    Distant Location (provider location):  [facility] [place]     Platform used for Video Visit: Frederick Zimmerman MD      "

## 2020-11-10 NOTE — LETTER
"  11/10/2020      RE: Iman Lee  [address]  [facility] [address]      Dear Colleague,    Thank you for referring your patient, Iman Lee, to the [facility] [place]. Please see a copy of my visit note below.    Iman Lee is a 55 year old female who is being evaluated via a billable video visit.      The patient has been notified of following:     \"This video visit will be conducted via a call between you and your physician/provider. We have found that certain health care needs can be provided without the need for an in-person physical exam.  This service lets us provide the care you need with a video conversation.  If a prescription is necessary we can send it directly to your pharmacy.  If lab work is needed we can place an order for that and you can then stop by our lab to have the test done at a later time.    Video visits are billed at different rates depending on your insurance coverage.  Please reach out to your insurance provider with any questions.    If during the course of the call the physician/provider feels a video visit is not appropriate, you will not be charged for this service.\"    Patient has given verbal consent for Video visit? Yes  How would you like to obtain your AVS? MyChart  If you are dropped from the video visit, the video invite should be resent to: Text to cell phone: [phone]  Will anyone else be joining your video visit? No    During this virtual visit the patient is located in [place], patient verifies this as the location during the entirety of this visit.         Video-Visit Details    Type of service:  Video Visit    Video Start Time: 2:59PM  Video End Time: 3:15PM    Originating Location (pt. Location): Home    Distant Location (provider location):  [facility] [place]     Platform used for Video Visit: Frederick Zimmerman MD      GI CLINIC VISIT    CC/REFERRING " MD:  No ref. provider found  REASON FOR CONSULTATION:   No ref. provider found for   Chief Complaint   Patient presents with     Consult     New appointment.       ASSESSMENT/PLAN:  Iman is a pleasant 55-year-old female who is here today to discuss several year history of intermittent sharp left lower quadrant abdominal discomfort.    1.  Intermittent and self-limited episodes of sharp left lower quadrant abdominal discomfort.  Her history seems quite consistent with uncomplicated diverticulitis.  I did discuss this in detail with Iman today.  Though her symptoms are consistent with this we have never had a CT scan done to confirm that she is actually having diverticulitis.  The differential diagnosis also includes irritable bowel syndrome with colon spasms, constipation, and other less common etiologies such as segmental colitis associated with diverticulitis (scad).  Given what she is describing I think she likely has some intermittent mild cases of uncomplicated diverticulitis.    Moving forward I think there are some things we can do to help minimize these episodes and also shorten their duration when they do occur.  I have asked her to start Citrucel fiber powder.  She will take 1 tablespoon/day and slowly titrate up to 1 tablespoon 3 times per day.  When she does feel an episode coming on I recommend that she transition to a liquid diet for 48 hours.  At the same time I recommend she start MiraLAX 1-2 capfuls a day.  After 48 hours and when she is feeling better she can slowly expand her diet.  I will also give her prescription for dicyclomine, which is an antispasmodic that she can take as needed for these episodes.    Finally, the next time she does have 1 of these episodes she will contact the clinic and we will schedule a CT scan to be done to see if we can confirm the presence of diverticulitis.    At this point I believe there is no indication for antibiotic therapy.    If we do confirm that she  has diverticulitis and conservative therapies do not minimize the recurrence then we can consider referral to colorectal surgeon to discuss partial colectomy.    I discussed the plan with Iman and she agrees with our course of action.  All questions were answered to her stated satisfaction.    Thank you very much for the opportunity to take part in the case patient please not hesitate to call for questions.    RTC 3 months    Thank you for this consultation.  It was a pleasure to participate in the care of this patient; please contact us with any further questions.     This note was created with voice recognition software, and while reviewed for accuracy, typos may remain.     Husam Zimmerman MD  Inflammatory Bowel Disease Program  Division of Gastroenterology, Hepatology and Nutrition  [facility]  Pager: [phone]    HPI:  Ms. Lee is a pleasant 55-year-old female with a past medical history of irritable bowel syndrome is here today to discuss intermittent episodes of left lower quadrant abdominal pain that she believes may be due to diverticulitis.    She reports that a few years ago she developed left lower quadrant abdominal discomfort.  She was given the clinical diagnosis of diverticulitis as well as the diagnosis of irritable bowel syndrome.  She does not believe she has ever undergone a CT scan to confirm diverticulitis and I can find no evidence of this in her epic chart or Care Everywhere.    She reports that ever since this time that she will have 3-4 episodes of this left lower quadrant abdominal pain per year.  Most recently this happened in July.  She reports that when she gets this episode it will begin with some pain near her umbilicus.  She will then developed sharp left lower quadrant abdominal pain.  This will be most severe for the first 24 hours.  The pain then becomes dull and fades over the subsequent 4 days.  She has found that if she stops eating for a day or 2 this can help  "abort or limit the discomfort.  She also notes associated constipation during these episodes but denies any significant constipation at other times.  She never uses any laxatives and she has never used an antispasmodic.  She does not use any fiber.    It does not seem like she is ever seen a gastroenterologist and has not had any other therapies for irritable bowel syndrome.  She reports that in general she has 1 bowel movement per day.  This bowel movement is formed and there is no blood in the stool.  She will sometimes have looser bowel movements but there is no associated abdominal discomfort.  These looser bowel movements are what she terms \"irritable bowel syndrome.\"    She did have a colonoscopy in 2019.  The terminal ileum was normal.  There were multiple small and large mouth diverticuli in the sigmoid colon and transverse colon.  There was no evidence of inflammation or bleeding.    ROS:    No fevers or chills  No weight loss  No blurry vision, double vision or change in vision  No sore throat  No lymphadenopathy  No headache, paraesthesias, or weakness in a limb  No shortness of breath or wheezing  No chest pain or pressure  No arthralgias or myalgias  No rashes or skin changes  No odynophagia or dysphagia  No BRBPR, hematochezia, melena  No dysuria, frequency or urgency  No hot/cold intolerance or polyria  No anxiety or depression    PREVIOUS ENDOSCOPY:  Colonoscopy as above    PERTINENT RELEVANT IMAGING OR LABS:  In August of this year she had a CBC, LFTs, BMP, ESR, CRP and all of these were normal.  She is also had a TTG that is normal.    ALLERGIES:     Allergies   Allergen Reactions     Erythromycin      Iodine      Penicillins      Percocet [Oxycodone-Acetaminophen]      Latex Rash       PERTINENT MEDICATIONS:    Current Outpatient Medications:      albuterol (PROAIR HFA/PROVENTIL HFA/VENTOLIN HFA) 108 (90 Base) MCG/ACT inhaler, INHALE 8 PUFFS PER EMERGENCY ACTION PLAN. USE WITH SPACER., Disp: , " Rfl:      cetirizine (ZYRTEC) 10 MG tablet, Take 10 mg by mouth, Disp: , Rfl:      citalopram (CELEXA) 20 MG tablet, , Disp: , Rfl:      estradiol (VAGIFEM) 10 MCG TABS vaginal tablet, INSERT 1 TABLET INTO THE VAGINA EVERY MONDAY AND THURSDAY., Disp: , Rfl:      IBUPROFEN PO, , Disp: , Rfl:      multivitamin w/minerals (THERA-VIT-M) tablet, Take 1 tablet by mouth, Disp: , Rfl:      traZODone (DESYREL) 25 MG TABS, Take 150 mg by mouth At Bedtime., Disp: , Rfl:     PROBLEM LIST  Patient Active Problem List    Diagnosis Date Noted     Hip joint pain 01/05/2011     Priority: Medium     Abnormal gait 01/05/2011     Priority: Medium     Hip pain 09/27/2010     Priority: Medium     Nonallopathic lesion of sacral region 07/06/2007     Priority: Medium     Problem list name updated by automated process. Provider to review       iamENTHESOPATHY OF HIP 06/19/2007     Priority: Medium     Nonallopathic lesion of lower extremities 06/19/2007     Priority: Medium     Problem list name updated by automated process. Provider to review         PERTINENT PAST MEDICAL HISTORY:  Past Medical History:   Diagnosis Date     Depressive disorder      Diverticulitis      IBS (irritable bowel syndrome)      Scoliosis        PREVIOUS SURGERIES:  Past Surgical History:   Procedure Laterality Date     C HAND/FINGER SURGERY UNLISTED  2007    Right thumb     C PELVIS/HIP JOINT SURGERY UNLISTED  2010    Right hip     C STOMACH SURGERY PROCEDURE UNLISTED  2014    Abdominal hernia     CHOLECYSTECTOMY       COLONOSCOPY N/A 5/15/2019    Procedure: COLONOSCOPY;  Surgeon: Danny Diop MD;  Location:  GI       SOCIAL HISTORY:  Social History     Socioeconomic History     Marital status:      Spouse name: Not on file     Number of children: Not on file     Years of education: Not on file     Highest education level: Not on file   Occupational History     Not on file   Social Needs     Financial resource strain: Not on file     Food  "insecurity     Worry: Not on file     Inability: Not on file     Transportation needs     Medical: Not on file     Non-medical: Not on file   Tobacco Use     Smoking status: Never Smoker     Smokeless tobacco: Never Used   Substance and Sexual Activity     Alcohol use: Yes     Comment: 1 glass 3x4 times a week     Drug use: No     Sexual activity: Yes     Partners: Male     Birth control/protection: Female Surgical   Lifestyle     Physical activity     Days per week: Not on file     Minutes per session: Not on file     Stress: Not on file   Relationships     Social connections     Talks on phone: Not on file     Gets together: Not on file     Attends Church service: Not on file     Active member of club or organization: Not on file     Attends meetings of clubs or organizations: Not on file     Relationship status: Not on file     Intimate partner violence     Fear of current or ex partner: Not on file     Emotionally abused: Not on file     Physically abused: Not on file     Forced sexual activity: Not on file   Other Topics Concern     Parent/sibling w/ CABG, MI or angioplasty before 65F 55M? Not Asked   Social History Narrative     Not on file       FAMILY HISTORY:  Family History   Problem Relation Age of Onset     Hypertension Mother      Osteoporosis Mother      Hypertension Father      Hyperlipidemia Father        Past/family/social history reviewed and no changes    PHYSICAL EXAMINATION:  Constitutional: aaox3, cooperative, pleasant, not dyspneic/diaphoretic, no acute distress  Vitals reviewed: Ht 1.727 m (5' 8\")   Wt 74.8 kg (165 lb)   BMI 25.09 kg/m    Wt:   Wt Readings from Last 2 Encounters:   11/10/20 74.8 kg (165 lb)   05/15/19 74.8 kg (165 lb)      Eyes: Sclera anicteric/injected  Respiratory: Unlabored breathing  Skin: no jaundice  Psych: Normal affect    Husam Zimmerman MD    "

## 2021-01-03 DIAGNOSIS — K57.30 DIVERTICULOSIS OF LARGE INTESTINE WITHOUT HEMORRHAGE: ICD-10-CM

## 2021-01-03 DIAGNOSIS — K58.8 OTHER IRRITABLE BOWEL SYNDROME: ICD-10-CM

## 2021-01-07 RX ORDER — DICYCLOMINE HYDROCHLORIDE 10 MG/1
10 CAPSULE ORAL 4 TIMES DAILY PRN
Qty: 240 CAPSULE | Refills: 3 | Status: SHIPPED | OUTPATIENT
Start: 2021-01-07 | End: 2023-05-04

## 2021-01-07 NOTE — TELEPHONE ENCOUNTER
DICYCLOMINE 10 MG CAPSULE  Last Written Prescription Date:  11/10/2020  Last Fill Quantity: 240,   # refills: 3  Last Office Visit : 11/10/2020  Future Office visit:  2/16/2021  240 Caps, 3 Refills sent to pharm 1/7/2021      Leticia Hope RN  Central Triage Red Flags/Med Refills

## 2021-01-15 ENCOUNTER — HEALTH MAINTENANCE LETTER (OUTPATIENT)
Age: 56
End: 2021-01-15

## 2021-02-16 ENCOUNTER — VIRTUAL VISIT (OUTPATIENT)
Dept: GASTROENTEROLOGY | Facility: CLINIC | Age: 56
End: 2021-02-16
Payer: COMMERCIAL

## 2021-02-16 VITALS — WEIGHT: 165 LBS | BODY MASS INDEX: 25.09 KG/M2

## 2021-02-16 DIAGNOSIS — K58.8 OTHER IRRITABLE BOWEL SYNDROME: Primary | ICD-10-CM

## 2021-02-16 DIAGNOSIS — K57.30 DIVERTICULOSIS OF LARGE INTESTINE WITHOUT HEMORRHAGE: ICD-10-CM

## 2021-02-16 PROCEDURE — 99213 OFFICE O/P EST LOW 20 MIN: CPT | Mod: GT | Performed by: INTERNAL MEDICINE

## 2021-02-16 ASSESSMENT — PAIN SCALES - GENERAL: PAINLEVEL: NO PAIN (0)

## 2021-02-16 NOTE — LETTER
"    2/16/2021         RE: Iman Lee  [address]  [facility] [address]        Dear Colleague,    Thank you for referring your patient, Iman Lee, to the [facility] [place]. Please see a copy of my visit note below.    Iman Lee is a 55 year old female who is being evaluated via a billable video visit.      The patient has been notified of following:     \"This video visit will be conducted via a call between you and your physician/provider. We have found that certain health care needs can be provided without the need for an in-person physical exam.  This service lets us provide the care you need with a video conversation.  If a prescription is necessary we can send it directly to your pharmacy.  If lab work is needed we can place an order for that and you can then stop by our lab to have the test done at a later time.    If during the course of the call the physician/provider feels a video visit is not appropriate, you will not be charged for this service.\"     Patient confirmed that they are in [place] for today's visit : yes    If the video visit is dropped, please send link to:   Text to cell phone: [phone]    Video-Visit Details  Type of service:  Video Visit    Video Start Time: 12:57 PM  Video End Time:  1:11 PM    Originating Location (pt. Location): Home    Distant Location (provider location):  [facility]     Platform used: [facility] [place]      GI CLINIC VISIT    CC/REFERRING MD:  Referred Self  REASON FOR CONSULTATION:   Referred Self for   Chief Complaint   Patient presents with     Video Visit     3mo f/u       ASSESSMENT/PLAN:    1.  Intermittent episodes of left lower quadrant abdominal pain-as outlined previously it possible that she could have some mild episodes of colonic diverticulitis versus irritable bowel syndrome with colon spasm versus a combination of both.    She has done well by " actually following a lower fiber diet.  We discussed that as long as her symptoms are under good control and she is having regular bowel movements that I do not think she should be too worried about the lower fiber content of her diet.  I do recommend that she make sure that she gets a well-balanced diet and she is not eating exclusively processed foods.    I think it is reasonable for her to try Citrucel powder formulation and start this at a very low dose.  She will start 1 teaspoon daily in glass of water and slowly titrate up by 1 teaspoon every 1 to 2 weeks until she is at 3 teaspoons (1 tablespoon) daily.  She should continue to drink 64 ounces of water daily.  If she finds that this dose of fiber flares her symptoms and we discussed that she can just stop the fiber supplementation moving forward.    If she is to get a flare of her symptoms then she should follow previous recommendations of going down to a liquid diet and holding her fiber supplementation.  She can take the dicyclomine as needed.    I am not quite sure what to make of the rash on her extremities and trunk.  I think it is likely not related to her GI symptoms.  Certain rashes can be related to inflammatory bowel disease but she has had no evidence of this.  Dermatitis herpetiformis is a rash that can be associated with celiac disease.  However, she has had celiac markers checked and were negative.  We did discuss the option of performing an upper endoscopy with duodenal biopsies that she is doing well at this time she would like to hold off for now.  I did recommend that she establish care with a dermatologist.  Then when she has this rash she can contact the clinic and get him be seen so it can be evaluated.  She will look into this.    All questions were answered to the patient's stated satisfaction.  We will arrange follow-up in 6 months.  She will contact us if there are issues before then.    RTC 6 months    Thank you for this  consultation.  It was a pleasure to participate in the care of this patient; please contact us with any further questions.      This note was created with voice recognition software, and while reviewed for accuracy, typos may remain.     Husam Zimmerman MD  Inflammatory Bowel Disease Program  Division of Gastroenterology, Hepatology and Nutrition  [facility]  Pager: [phone]      HPI:    Iman is a very pleasant 55-year-old female who is here today to follow-up for intermittent episodes of left lower quadrant pain.  I direct you to my initial consult note from November 2020 for full details.    Briefly, we hypothesized that this may be due to mild episodes of diverticulitis versus irritable bowel syndrome with colon spasms, constipation, etc.  We recommended that she start Citrucel fiber powder and titrate this up to 3 tablespoons/day.  During an acute episode we recommended that she go to a liquid diet and take MiraLAX until she was feeling improved.  Dicyclomine was also given to be taken as needed.    Today, she is here to follow-up.  She reports that she tried starting fiber supplementation after her last visit.  She used a [employer] brand pill.  Within a few days of starting the fiber she felt she had a mini episode of her left lower quadrant pain.  She went to a liquid diet and symptoms resolved.  She again tried to start the fiber pill but again she felt this flared her symptoms.  Since then she is going to a low fiber diet she is really been feeling quite well.  She has had no flares in her symptoms.  She has had 1 bowel movement per day.    Though she is feeling well she is a little worried about staying on a low fiber diet.  Otherwise she has no concerns.    She also reports that she has had a red slightly raised rash intermittently on the flexor surfaces of her arms as well as her trunk.  She is never seen a dermatologist for this.  This comes and goes.  This does not happen when she has a flare  of her GI symptoms.    ROS:    No fevers or chills  No weight loss  No blurry vision, double vision or change in vision  No sore throat  No lymphadenopathy  No headache, paraesthesias, or weakness in a limb  No shortness of breath or wheezing  No chest pain or pressure  No arthralgias or myalgias  No rashes or skin changes  No odynophagia or dysphagia  No BRBPR, hematochezia, melena  No dysuria, frequency or urgency  No hot/cold intolerance or polyria  No anxiety or depression    PREVIOUS ENDOSCOPY:  Colonoscopy-2019.  The TI was normal.  There are multiple small and large mouth diverticuli in the sigmoid and transverse colon.  No evidence of inflammation or bleeding.    PERTINENT RELEVANT IMAGING OR LABS:  She has never had a CT scan of the abdomen pelvis.    CBC, LFTs, BMP all normal.  She has had a negative TTG in 2019.    ALLERGIES:     Allergies   Allergen Reactions     Erythromycin      Iodine      Penicillins      Percocet [Oxycodone-Acetaminophen]      Latex Rash       PERTINENT MEDICATIONS:    Current Outpatient Medications:      traZODone (DESYREL) 25 MG TABS, Take 150 mg by mouth At Bedtime., Disp: , Rfl:      albuterol (PROAIR HFA/PROVENTIL HFA/VENTOLIN HFA) 108 (90 Base) MCG/ACT inhaler, INHALE 8 PUFFS PER EMERGENCY ACTION PLAN. USE WITH SPACER., Disp: , Rfl:      cetirizine (ZYRTEC) 10 MG tablet, Take 10 mg by mouth, Disp: , Rfl:      citalopram (CELEXA) 20 MG tablet, , Disp: , Rfl:      dicyclomine (BENTYL) 10 MG capsule, Take 1 capsule (10 mg) by mouth 4 times daily as needed (abdominal pain) (Patient not taking: Reported on 2/16/2021), Disp: 240 capsule, Rfl: 3     estradiol (VAGIFEM) 10 MCG TABS vaginal tablet, INSERT 1 TABLET INTO THE VAGINA EVERY MONDAY AND THURSDAY., Disp: , Rfl:      IBUPROFEN PO, , Disp: , Rfl:      multivitamin w/minerals (THERA-VIT-M) tablet, Take 1 tablet by mouth, Disp: , Rfl:     PROBLEM LIST  Patient Active Problem List    Diagnosis Date Noted     Hip joint pain  01/05/2011     Priority: Medium     Abnormal gait 01/05/2011     Priority: Medium     Hip pain 09/27/2010     Priority: Medium     Nonallopathic lesion of sacral region 07/06/2007     Priority: Medium     Problem list name updated by automated process. Provider to review       iamENTHESOPATHY OF HIP 06/19/2007     Priority: Medium     Nonallopathic lesion of lower extremities 06/19/2007     Priority: Medium     Problem list name updated by automated process. Provider to review         PERTINENT PAST MEDICAL HISTORY:  Past Medical History:   Diagnosis Date     Depressive disorder      Diverticulitis      IBS (irritable bowel syndrome)      Scoliosis        PREVIOUS SURGERIES:  Past Surgical History:   Procedure Laterality Date     C HAND/FINGER SURGERY UNLISTED  2007    Right thumb     C PELVIS/HIP JOINT SURGERY UNLISTED  2010    Right hip     C STOMACH SURGERY PROCEDURE UNLISTED  2014    Abdominal hernia     CHOLECYSTECTOMY       COLONOSCOPY N/A 5/15/2019    Procedure: COLONOSCOPY;  Surgeon: Danny Diop MD;  Location: [facility]       SOCIAL HISTORY:  Social History     Socioeconomic History     Marital status:      Spouse name: Not on file     Number of children: Not on file     Years of education: Not on file     Highest education level: Not on file   Occupational History     Not on file   Social Needs     Financial resource strain: Not on file     Food insecurity     Worry: Not on file     Inability: Not on file     Transportation needs     Medical: Not on file     Non-medical: Not on file   Tobacco Use     Smoking status: Never Smoker     Smokeless tobacco: Never Used   Substance and Sexual Activity     Alcohol use: Yes     Comment: 1 glass 3x4 times a week     Drug use: No     Sexual activity: Yes     Partners: Male     Birth control/protection: Female Surgical   Lifestyle     Physical activity     Days per week: Not on file     Minutes per session: Not on file     Stress: Not on file    Relationships     Social connections     Talks on phone: Not on file     Gets together: Not on file     Attends Mandaeism service: Not on file     Active member of club or organization: Not on file     Attends meetings of clubs or organizations: Not on file     Relationship status: Not on file     Intimate partner violence     Fear of current or ex partner: Not on file     Emotionally abused: Not on file     Physically abused: Not on file     Forced sexual activity: Not on file   Other Topics Concern     Parent/sibling w/ CABG, MI or angioplasty before 65F 55M? Not Asked   Social History Narrative     Not on file       FAMILY HISTORY:  Family History   Problem Relation Age of Onset     Hypertension Mother      Osteoporosis Mother      Hypertension Father      Hyperlipidemia Father        Past/family/social history reviewed and no changes    PHYSICAL EXAMINATION:  Constitutional: aaox3, cooperative, pleasant, not dyspneic/diaphoretic, no acute distress  Vitals reviewed: Wt 74.8 kg (165 lb)   BMI 25.09 kg/m    Wt:   Wt Readings from Last 2 Encounters:   02/16/21 74.8 kg (165 lb)   11/10/20 74.8 kg (165 lb)      Eyes: Sclera anicteric/injected  Respiratory: Unlabored breathing  Skin: no jaundice  Psych: Normal affect    Again, thank you for allowing me to participate in the care of your patient.      Sincerely,    Husam Zimmerman MD

## 2021-02-16 NOTE — PATIENT INSTRUCTIONS
It was very nice to meet with you again at your virtual visit.    I do recommend that you get some Citrucel fiber powder supplementation.  I recommend you start 1 teaspoon daily in a glass of water.  After 7 to 10 days you can increase to 1 teaspoon twice daily in a glass of water.  After 7 to 10 days you can increase up to 1 teaspoon 3 times daily in a glass of water.  If you find this again makes her symptoms worse you can stop the fiber.    Please make sure to drink 64 ounces of water daily.    If you have a flare in your symptoms you can go to a liquid diet and take the dicyclomine as needed for spasms and pain.    If you have constipation during one of your flares then you can take the MiraLAX as needed in a glass of water.    I do recommend that you establish care with a dermatologist so that when you have a recurrence of your rash they can get in to see you.    Follow-up in 6 months.

## 2021-02-16 NOTE — NURSING NOTE
Chief Complaint   Patient presents with     Video Visit     3mo f/u       Vitals:    02/16/21 1240   Weight: 165 lb       Body mass index is 25.09 kg/m .    Ani Burns MA

## 2021-02-16 NOTE — PROGRESS NOTES
GI CLINIC VISIT    CC/REFERRING MD:  Referred Self  REASON FOR CONSULTATION:   Referred Self for   Chief Complaint   Patient presents with     Video Visit     3mo f/u       ASSESSMENT/PLAN:    1.  Intermittent episodes of left lower quadrant abdominal pain-as outlined previously it possible that she could have some mild episodes of colonic diverticulitis versus irritable bowel syndrome with colon spasm versus a combination of both.    She has done well by actually following a lower fiber diet.  We discussed that as long as her symptoms are under good control and she is having regular bowel movements that I do not think she should be too worried about the lower fiber content of her diet.  I do recommend that she make sure that she gets a well-balanced diet and she is not eating exclusively processed foods.    I think it is reasonable for her to try Citrucel powder formulation and start this at a very low dose.  She will start 1 teaspoon daily in glass of water and slowly titrate up by 1 teaspoon every 1 to 2 weeks until she is at 3 teaspoons (1 tablespoon) daily.  She should continue to drink 64 ounces of water daily.  If she finds that this dose of fiber flares her symptoms and we discussed that she can just stop the fiber supplementation moving forward.    If she is to get a flare of her symptoms then she should follow previous recommendations of going down to a liquid diet and holding her fiber supplementation.  She can take the dicyclomine as needed.    I am not quite sure what to make of the rash on her extremities and trunk.  I think it is likely not related to her GI symptoms.  Certain rashes can be related to inflammatory bowel disease but she has had no evidence of this.  Dermatitis herpetiformis is a rash that can be associated with celiac disease.  However, she has had celiac markers checked and were negative.  We did discuss the option of performing an upper endoscopy with duodenal biopsies that she is  doing well at this time she would like to hold off for now.  I did recommend that she establish care with a dermatologist.  Then when she has this rash she can contact the clinic and get him be seen so it can be evaluated.  She will look into this.    All questions were answered to the patient's stated satisfaction.  We will arrange follow-up in 6 months.  She will contact us if there are issues before then.    RTC 6 months    Thank you for this consultation.  It was a pleasure to participate in the care of this patient; please contact us with any further questions.      This note was created with voice recognition software, and while reviewed for accuracy, typos may remain.     Husam Zimmerman MD  Inflammatory Bowel Disease Program  Division of Gastroenterology, Hepatology and Nutrition  [facility]  Pager: [phone]      HPI:    Iman is a very pleasant 55-year-old female who is here today to follow-up for intermittent episodes of left lower quadrant pain.  I direct you to my initial consult note from November 2020 for full details.    Briefly, we hypothesized that this may be due to mild episodes of diverticulitis versus irritable bowel syndrome with colon spasms, constipation, etc.  We recommended that she start Citrucel fiber powder and titrate this up to 3 tablespoons/day.  During an acute episode we recommended that she go to a liquid diet and take MiraLAX until she was feeling improved.  Dicyclomine was also given to be taken as needed.    Today, she is here to follow-up.  She reports that she tried starting fiber supplementation after her last visit.  She used a [employer] brand pill.  Within a few days of starting the fiber she felt she had a mini episode of her left lower quadrant pain.  She went to a liquid diet and symptoms resolved.  She again tried to start the fiber pill but again she felt this flared her symptoms.  Since then she is going to a low fiber diet she is really been feeling quite  well.  She has had no flares in her symptoms.  She has had 1 bowel movement per day.    Though she is feeling well she is a little worried about staying on a low fiber diet.  Otherwise she has no concerns.    She also reports that she has had a red slightly raised rash intermittently on the flexor surfaces of her arms as well as her trunk.  She is never seen a dermatologist for this.  This comes and goes.  This does not happen when she has a flare of her GI symptoms.    ROS:    No fevers or chills  No weight loss  No blurry vision, double vision or change in vision  No sore throat  No lymphadenopathy  No headache, paraesthesias, or weakness in a limb  No shortness of breath or wheezing  No chest pain or pressure  No arthralgias or myalgias  No rashes or skin changes  No odynophagia or dysphagia  No BRBPR, hematochezia, melena  No dysuria, frequency or urgency  No hot/cold intolerance or polyria  No anxiety or depression    PREVIOUS ENDOSCOPY:  Colonoscopy-2019.  The TI was normal.  There are multiple small and large mouth diverticuli in the sigmoid and transverse colon.  No evidence of inflammation or bleeding.    PERTINENT RELEVANT IMAGING OR LABS:  She has never had a CT scan of the abdomen pelvis.    CBC, LFTs, BMP all normal.  She has had a negative TTG in 2019.    ALLERGIES:     Allergies   Allergen Reactions     Erythromycin      Iodine      Penicillins      Percocet [Oxycodone-Acetaminophen]      Latex Rash       PERTINENT MEDICATIONS:    Current Outpatient Medications:      traZODone (DESYREL) 25 MG TABS, Take 150 mg by mouth At Bedtime., Disp: , Rfl:      albuterol (PROAIR HFA/PROVENTIL HFA/VENTOLIN HFA) 108 (90 Base) MCG/ACT inhaler, INHALE 8 PUFFS PER EMERGENCY ACTION PLAN. USE WITH SPACER., Disp: , Rfl:      cetirizine (ZYRTEC) 10 MG tablet, Take 10 mg by mouth, Disp: , Rfl:      citalopram (CELEXA) 20 MG tablet, , Disp: , Rfl:      dicyclomine (BENTYL) 10 MG capsule, Take 1 capsule (10 mg) by mouth 4  times daily as needed (abdominal pain) (Patient not taking: Reported on 2/16/2021), Disp: 240 capsule, Rfl: 3     estradiol (VAGIFEM) 10 MCG TABS vaginal tablet, INSERT 1 TABLET INTO THE VAGINA EVERY MONDAY AND THURSDAY., Disp: , Rfl:      IBUPROFEN PO, , Disp: , Rfl:      multivitamin w/minerals (THERA-VIT-M) tablet, Take 1 tablet by mouth, Disp: , Rfl:     PROBLEM LIST  Patient Active Problem List    Diagnosis Date Noted     Hip joint pain 01/05/2011     Priority: Medium     Abnormal gait 01/05/2011     Priority: Medium     Hip pain 09/27/2010     Priority: Medium     Nonallopathic lesion of sacral region 07/06/2007     Priority: Medium     Problem list name updated by automated process. Provider to review       iamENTHESOPATHY OF HIP 06/19/2007     Priority: Medium     Nonallopathic lesion of lower extremities 06/19/2007     Priority: Medium     Problem list name updated by automated process. Provider to review         PERTINENT PAST MEDICAL HISTORY:  Past Medical History:   Diagnosis Date     Depressive disorder      Diverticulitis      IBS (irritable bowel syndrome)      Scoliosis        PREVIOUS SURGERIES:  Past Surgical History:   Procedure Laterality Date     C HAND/FINGER SURGERY UNLISTED  2007    Right thumb     C PELVIS/HIP JOINT SURGERY UNLISTED  2010    Right hip     C STOMACH SURGERY PROCEDURE UNLISTED  2014    Abdominal hernia     CHOLECYSTECTOMY       COLONOSCOPY N/A 5/15/2019    Procedure: COLONOSCOPY;  Surgeon: Danny Diop MD;  Location:  GI       SOCIAL HISTORY:  Social History     Socioeconomic History     Marital status:      Spouse name: Not on file     Number of children: Not on file     Years of education: Not on file     Highest education level: Not on file   Occupational History     Not on file   Social Needs     Financial resource strain: Not on file     Food insecurity     Worry: Not on file     Inability: Not on file     Transportation needs     Medical: Not on file      Non-medical: Not on file   Tobacco Use     Smoking status: Never Smoker     Smokeless tobacco: Never Used   Substance and Sexual Activity     Alcohol use: Yes     Comment: 1 glass 3x4 times a week     Drug use: No     Sexual activity: Yes     Partners: Male     Birth control/protection: Female Surgical   Lifestyle     Physical activity     Days per week: Not on file     Minutes per session: Not on file     Stress: Not on file   Relationships     Social connections     Talks on phone: Not on file     Gets together: Not on file     Attends Islam service: Not on file     Active member of club or organization: Not on file     Attends meetings of clubs or organizations: Not on file     Relationship status: Not on file     Intimate partner violence     Fear of current or ex partner: Not on file     Emotionally abused: Not on file     Physically abused: Not on file     Forced sexual activity: Not on file   Other Topics Concern     Parent/sibling w/ CABG, MI or angioplasty before 65F 55M? Not Asked   Social History Narrative     Not on file       FAMILY HISTORY:  Family History   Problem Relation Age of Onset     Hypertension Mother      Osteoporosis Mother      Hypertension Father      Hyperlipidemia Father        Past/family/social history reviewed and no changes    PHYSICAL EXAMINATION:  Constitutional: aaox3, cooperative, pleasant, not dyspneic/diaphoretic, no acute distress  Vitals reviewed: Wt 74.8 kg (165 lb)   BMI 25.09 kg/m    Wt:   Wt Readings from Last 2 Encounters:   02/16/21 74.8 kg (165 lb)   11/10/20 74.8 kg (165 lb)      Eyes: Sclera anicteric/injected  Respiratory: Unlabored breathing  Skin: no jaundice  Psych: Normal affect

## 2021-02-16 NOTE — PROGRESS NOTES
"Iman Lee is a 55 year old female who is being evaluated via a billable video visit.      The patient has been notified of following:     \"This video visit will be conducted via a call between you and your physician/provider. We have found that certain health care needs can be provided without the need for an in-person physical exam.  This service lets us provide the care you need with a video conversation.  If a prescription is necessary we can send it directly to your pharmacy.  If lab work is needed we can place an order for that and you can then stop by our lab to have the test done at a later time.    If during the course of the call the physician/provider feels a video visit is not appropriate, you will not be charged for this service.\"     Patient confirmed that they are in [place] for today's visit : yes    If the video visit is dropped, please send link to:   Text to cell phone: [phone]    Video-Visit Details  Type of service:  Video Visit    Video Start Time: 12:57 PM  Video End Time:  1:11 PM    Originating Location (pt. Location): Home    Distant Location (provider location):  [facility] [place]     Platform used: [place]            "

## 2021-02-19 ENCOUNTER — TELEPHONE (OUTPATIENT)
Dept: GASTROENTEROLOGY | Facility: CLINIC | Age: 56
End: 2021-02-19

## 2021-02-19 NOTE — TELEPHONE ENCOUNTER
MABELM for patient to schedule a video return visit with Dr. Zimmerman on around 8/16/21 for a 6 month follow-up appointment.

## 2021-02-23 ENCOUNTER — TELEPHONE (OUTPATIENT)
Dept: GASTROENTEROLOGY | Facility: CLINIC | Age: 56
End: 2021-02-23

## 2021-02-23 NOTE — TELEPHONE ENCOUNTER
LVM for pt to schedule for 6 month follow up video visit with Dr. Zimmerman. Left call center number. [employer].

## 2021-05-11 ENCOUNTER — THERAPY VISIT (OUTPATIENT)
Dept: PHYSICAL THERAPY | Facility: CLINIC | Age: 56
End: 2021-05-11
Payer: COMMERCIAL

## 2021-05-11 DIAGNOSIS — M62.89 PELVIC FLOOR DYSFUNCTION: ICD-10-CM

## 2021-05-11 DIAGNOSIS — R33.9 INCOMPLETE BLADDER EMPTYING: ICD-10-CM

## 2021-05-11 PROCEDURE — 97530 THERAPEUTIC ACTIVITIES: CPT | Mod: GP | Performed by: PHYSICAL THERAPIST

## 2021-05-11 PROCEDURE — 97140 MANUAL THERAPY 1/> REGIONS: CPT | Mod: GP | Performed by: PHYSICAL THERAPIST

## 2021-05-11 PROCEDURE — 97161 PT EVAL LOW COMPLEX 20 MIN: CPT | Performed by: PHYSICAL THERAPIST

## 2021-05-11 NOTE — LETTER
[facility]  [address]  [address]  [place] [address]  [phone]    May 14, 2021    Re: Iman Lee   :   1965  MRN:  [id number]   REFERRING PHYSICIAN:   SAAD Thompson    [facility]    Date of Initial Evaluation:  2021  Visits:  Rxs Used: 1  Reason for Referral:     Pelvic floor dysfunction  Incomplete bladder emptying    EVALUATION SUMMARY    Physical Therapy Initial Evaluation  Patient Health History  Iman Lee being seen for Pelvic floor issues post hysterectomy.   Problem began: 2021.   Problem occurred: Post surgery   Pain is reported as 2/10 on pain scale.  General health as reported by patient is good.  Pertinent medical history includes: changes in bowel/bladder and concussions/dizziness.     Medical allergies: latex.   Surgeries include:  Orthopedic surgery.    Current medications:  Anti-depressants, hormone replacement therapy and sleep medication.    Current occupation is .   Primary job tasks include:  Computer work.                Physical Therapy Initial Examination/Evaluation May 11, 2021   Iman Lee is a 56 year old female referred to physical therapy by SAAD Nova  for treatment of post-op pelvic floor dysfunction  with Precautions/Restrictions/MD instructions E&T    Therapist Assessment:   Clinical Impression: Pt presents to PT with primary complaint of urinary dysfunction & pelvic floor issues following hysterectomy on 2021.  Pt also reports ongoing L side low back/SI joint pain.   Per clinical examination, pt with areas of hypertonicity in pelvic floor and would benefit from working on improved control of pelvic floor. Pt will also benefit from manual therapy and stabilization for core to address both pelvic floor and low back pain symptoms.      Re: Iman Lee   :   1965  Subjective: Pt had a fibroid on L side so  had a hysterectomy on 2/23/2021. She has found that it is difficult to relax to empty bladder. Pt also reports ongoing L-sided low back/SI joint pain.   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 No    Do you leak with cough,sneeze, jumping, running?No   Any other activities that cause leaking? Yes, transition from sitting to standing  Do you have triggers that make you feel you can't wait to go to the bathroom? No     Type of pad and number used per day? 0   When you leak what is the amount? Small     How long can you delay the need to urinate? 30 minutes   How many times do you get up to urinate at night? 0    Can you stop the flow of urine when on the toilet? Yes  Is the volume of urine passed usually: average. (8sec rule=  250ml with average bladder storing  400-600ml)  Do you strain to pass urine? No  Do you have a slow or hesitant urinary stream? Yes  Do you have difficulty initiating the urine stream? No  Is urination painful?  No  How many bladder infections have you had in last 12 months? 0    Re: Iman LUCIO  Jesus   :   1965    Fluid intake(one glass is 8oz or one cup) 4 glasses/day,  2 caffinated glasses/day  1  alcohol glasses/day.    Bowel habits:  Frequency of bowel movements? 1 times a day   Consistancy of stool? soft formed  Do you ignore the urge to defecate? No  Do you strain to pass stool? No    Pelvic Pain:  Do you have any pelvic pain with intercourse, exams, use of tampons? Yes, before bowel movement   Is initial penetration during intercourse painful? Yes  Is deeper penetration painful? No  Do you use lubricant?   Yes What kind?  Oil     Given birth? Yes Any complications? No  # of vaginal delieveries? 3   # of C-sections? 0  # of episiotomies? 0  Are you sexually active? Yes  Have you ever been worried for your physical safety? No  Any abdominal or pelvic surgeries? Yes, gall bladder and hysterectomy   Are you having any regular exercise? Yes  Have you practiced the PF(kegel) exercises for 4 or more weeks? Yes     OBSERVATION  L iliium rotated anteriorly in standing     LUMBAR ROM  Flexion: WNL  Extension: Compensation in thoracic spine  Sidebending: WNL  Rotation: Minimal with compensation in thoracic spine      MUSCLE PERFORMANCE  Baseline PF tone:hyper on L side OI and LI otherwise hypotonic to palpation   PF Tone with cough: not tested  Valsalva: not tested  PF Response quality: sluggish to moderate   PF Power: Center: 2 .  Endurance: Maximum contraction in seconds: < 5 seconds  # of endurance contractions before fatigue: NT   Quick contraction repetitions prior to fatigue: <5 .  Specificity/accessory muscles: Adductors              Re: Iman Lee   :   1965    Hip MMT 2021 Left Right    Hip Flexion  4+/5 4+/5   Hip Abduction 4-/5 4-/5   Hip Extension  4/5 4+/5     PALPATION: Reproduction of pain symptoms in OI and LI on the L side   Therapeutic Activity (15 min): Today's session consisted of education regarding pelvic floor muscle anatomy, normal bladder function, urge  suppression techniques and/or relaxation techniques as indicated. Pt was instructed in the pathoanatomy of the pelvic floor utilizing pelvic model.  We discussed what pelvic floor physical therapy is, components of exam, and typical patient progression.  Pt was told that she was in control of exam progression, and if at any time was uncomfortable and wished to discontinue we could.     Assessment/Plan:    Patient is a 56 year old female with pelvic complaints.    Patient has the following significant findings with corresponding treatment plan.                Diagnosis 1:  Pelvic Floor Dysfunction   Pain -  manual therapy, self management, education and home program  Decreased strength - therapeutic exercise, therapeutic activities and home program  Impaired muscle performance - neuro re-education and home program  Decreased function - therapeutic activities and home program    Therapy Evaluation Codes:   1) History comprised of:   Personal factors that impact the plan of care:      Past/current experiences and Time since onset of symptoms.    Comorbidity factors that impact the plan of care are:      See EMR.     Medications impacting care: See EMR.  2) Examination of Body Systems comprised of:   Body structures and functions that impact the plan of care:      Lumbar spine, Pelvis and Sacral illiac joint.   Activity limitations that impact the plan of care are:      Walking, Urinary incontinence and incomplete bladder emptying.  3) Clinical presentation characteristics are:   Stable/Uncomplicated.  4) Decision-Making    Low complexity using standardized patient assessment instrument and/or measureable assessment of functional outcome.  Cumulative Therapy Evaluation is: Low complexity.    Previous and current functional limitations:  (See Goal Flow Sheet for this information)    Short term and Long term goals: (See Goal Flow Sheet for this information)     Communication ability:  Patient appears to be able to clearly  communicate and understand verbal and written communication and follow directions correctly.  Treatment Explanation - The following has been discussed with the patient:   RX ordered/plan of care  Re: Iman Lee   :   1965    Anticipated outcomes  Possible risks and side effects  This patient would benefit from PT intervention to resume normal activities.   Rehab potential is good.    Frequency:  1 X week, once daily  Duration:  for 6 weeks  Discharge Plan:  Achieve all LTG.  Independent in home treatment program.  Reach maximal therapeutic benefit.    Please refer to the daily flowsheet for treatment today, total treatment time and time spent performing 1:1 timed codes.       Thank you for your referral.    INQUIRIES  Therapist: Corine Howe, PT, DPT  [address]  [address] [place] [id number]  Phone: [phone]  Fax: [phone]

## 2021-05-11 NOTE — PROGRESS NOTES
Physical Therapy Initial Evaluation  Subjective:    Patient Health History  Imna Lee being seen for Pelvic floor issues post hysterectomy.     Problem began: 2/23/2021.   Problem occurred: Post surgery   Pain is reported as 2/10 on pain scale.  General health as reported by patient is good.  Pertinent medical history includes: changes in bowel/bladder and concussions/dizziness.     Medical allergies: latex.   Surgeries include:  Orthopedic surgery.    Current medications:  Anti-depressants, hormone replacement therapy and sleep medication.    Current occupation is .   Primary job tasks include:  Computer work.                                    Objective:  System    Physical Exam    General     ROS     Physical Therapy Initial Examination/Evaluation May 11, 2021   Iman Lee is a 56 year old female referred to physical therapy by [facility]  for treatment of post-op pelvic floor dysfunction  with Precautions/Restrictions/MD instructions E&T    Therapist Assessment:   Clinical Impression: Pt presents to PT with primary complaint of urinary dysfunction & pelvic floor issues following hysterectomy on 2/23/2021.  Pt also reports ongoing L side low back/SI joint pain.   Per clinical examination, pt with areas of hypertonicity in pelvic floor and would benefit from working on improved control of pelvic floor. Pt will also benefit from manual therapy and stabilization for core to address both pelvic floor and low back pain symptoms.    Subjective: Pt had a fibroid on L side so had a hysterectomy on 2/23/2021. She has found that it is difficult to relax to empty bladder. Pt also reports ongoing L-sided low back/SI joint pain.   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 No    Do you leak with cough,sneeze, jumping, running?No   Any other activities that cause leaking? Yes, transition from sitting to standing  Do you have triggers that make you feel you can't wait to go to the bathroom? No     Type of pad and number used per day? 0   When you leak what is the amount? Small     How long can you delay the need to urinate? 30 minutes   How many times do you get up to urinate at night? 0    Can you stop the flow of urine when on the toilet? Yes  Is the volume of urine passed usually: average. (8sec rule=  250ml with average bladder storing  400-600ml)    Do you strain to pass urine? No  Do you have a slow or hesitant urinary stream? Yes  Do you have difficulty initiating the urine stream? No  Is urination painful?  No    How many bladder infections have you had in last 12 months? 0    Fluid intake(one glass is 8oz or one cup) 4 glasses/day,  2 caffinated glasses/day  1  alcohol glasses/day.    Bowel habits:  Frequency of bowel movements? 1 times a day   Consistancy of stool? soft formed  Do you ignore the urge to defecate? No  Do you strain to pass stool? No    Pelvic Pain:  Do you have any pelvic pain with intercourse, exams, use of tampons? Yes, before bowel movement   Is initial penetration during  intercourse painful? Yes  Is deeper penetration painful? No  Do you use lubricant?   Yes What kind?  Oil       Given birth? Yes Any complications? No  # of vaginal delieveries? 3   # of C-sections? 0  # of episiotomies? 0  Are you sexually active? Yes  Have you ever been worried for your physical safety? No  Any abdominal or pelvic surgeries? Yes, gall bladder and hysterectomy   Are you having any regular exercise? Yes  Have you practiced the PF(kegel) exercises for 4 or more weeks? Yes     OBSERVATION  L iliium rotated anteriorly in standing     LUMBAR ROM  Flexion: WNL  Extension: Compensation in thoracic spine  Sidebending: WNL  Rotation: Minimal with compensation in thoracic spine        MUSCLE PERFORMANCE    Baseline PF tone:hyper on L side OI and LI otherwise hypotonic to palpation   PF Tone with cough: not tested  Valsalva: not tested  PF Response quality: sluggish to moderate   PF Power: Center: 2 .  Endurance: Maximum contraction in seconds: < 5 seconds  # of endurance contractions before fatigue: NT   Quick contraction repetitions prior to fatigue: <5 .  Specificity/accessory muscles: Adductors      Hip MMT 5/11/2021 Left Right    Hip Flexion  4+/5 4+/5   Hip Abduction 4-/5 4-/5   Hip Extension  4/5 4+/5         PALPATION: Reproduction of pain symptoms in OI and LI on the L side     Therapeutic Activity (15 min): Today's session consisted of education regarding pelvic floor muscle anatomy, normal bladder function, urge suppression techniques and/or relaxation techniques as indicated. Pt was instructed in the pathoanatomy of the pelvic floor utilizing pelvic model.  We discussed what pelvic floor physical therapy is, components of exam, and typical patient progression.  Pt was told that she was in control of exam progression, and if at any time was uncomfortable and wished to discontinue we could.         Assessment/Plan:    Patient is a 56 year old female with pelvic complaints.    Patient has the  following significant findings with corresponding treatment plan.                Diagnosis 1:  Pelvic Floor Dysfunction   Pain -  manual therapy, self management, education and home program  Decreased strength - therapeutic exercise, therapeutic activities and home program  Impaired muscle performance - neuro re-education and home program  Decreased function - therapeutic activities and home program    Therapy Evaluation Codes:   1) History comprised of:   Personal factors that impact the plan of care:      Past/current experiences and Time since onset of symptoms.    Comorbidity factors that impact the plan of care are:      See EMR.     Medications impacting care: See EMR.  2) Examination of Body Systems comprised of:   Body structures and functions that impact the plan of care:      Lumbar spine, Pelvis and Sacral illiac joint.   Activity limitations that impact the plan of care are:      Walking, Urinary incontinence and incomplete bladder emptying.  3) Clinical presentation characteristics are:   Stable/Uncomplicated.  4) Decision-Making    Low complexity using standardized patient assessment instrument and/or measureable assessment of functional outcome.  Cumulative Therapy Evaluation is: Low complexity.    Previous and current functional limitations:  (See Goal Flow Sheet for this information)    Short term and Long term goals: (See Goal Flow Sheet for this information)     Communication ability:  Patient appears to be able to clearly communicate and understand verbal and written communication and follow directions correctly.  Treatment Explanation - The following has been discussed with the patient:   RX ordered/plan of care  Anticipated outcomes  Possible risks and side effects  This patient would benefit from PT intervention to resume normal activities.   Rehab potential is good.    Frequency:  1 X week, once daily  Duration:  for 6 weeks  Discharge Plan:  Achieve all LTG.  Independent in home treatment  program.  Reach maximal therapeutic benefit.    Please refer to the daily flowsheet for treatment today, total treatment time and time spent performing 1:1 timed codes.

## 2021-05-13 PROBLEM — R33.9 INCOMPLETE BLADDER EMPTYING: Status: ACTIVE | Noted: 2021-05-13

## 2021-05-13 PROBLEM — M62.89 PELVIC FLOOR DYSFUNCTION: Status: ACTIVE | Noted: 2021-05-13

## 2021-05-18 ENCOUNTER — THERAPY VISIT (OUTPATIENT)
Dept: PHYSICAL THERAPY | Facility: CLINIC | Age: 56
End: 2021-05-18
Payer: COMMERCIAL

## 2021-05-18 DIAGNOSIS — M62.89 PELVIC FLOOR DYSFUNCTION: ICD-10-CM

## 2021-05-18 DIAGNOSIS — R33.9 INCOMPLETE BLADDER EMPTYING: ICD-10-CM

## 2021-05-18 PROCEDURE — 97112 NEUROMUSCULAR REEDUCATION: CPT | Mod: GP | Performed by: PHYSICAL THERAPIST

## 2021-05-18 PROCEDURE — 97140 MANUAL THERAPY 1/> REGIONS: CPT | Mod: GP | Performed by: PHYSICAL THERAPIST

## 2021-06-07 ENCOUNTER — THERAPY VISIT (OUTPATIENT)
Dept: PHYSICAL THERAPY | Facility: CLINIC | Age: 56
End: 2021-06-07
Payer: COMMERCIAL

## 2021-06-07 DIAGNOSIS — M62.89 PELVIC FLOOR DYSFUNCTION: ICD-10-CM

## 2021-06-07 DIAGNOSIS — R33.9 INCOMPLETE BLADDER EMPTYING: ICD-10-CM

## 2021-06-07 PROCEDURE — 97140 MANUAL THERAPY 1/> REGIONS: CPT | Mod: GP | Performed by: PHYSICAL THERAPIST

## 2021-06-07 PROCEDURE — 97112 NEUROMUSCULAR REEDUCATION: CPT | Mod: GP | Performed by: PHYSICAL THERAPIST

## 2021-06-07 PROCEDURE — 97110 THERAPEUTIC EXERCISES: CPT | Mod: GP | Performed by: PHYSICAL THERAPIST

## 2021-06-08 NOTE — PROGRESS NOTES
Physical Therapy  Physical Therapy Daily Outpatient Documentation      Rx Units: 1- NR 1- CRP   Total OP Minutes: 35  Treatment #:14/15 (5/6) - [facility]     Pain: 0/10  Location: dizziness  Intervention: see below     Subjective: Patient reports continued improvements in her dizziness and has not noticed symptoms with bed mobility. Her main complaint is symptoms when she turns in tight areas.  She has returned to her normal exercise routine without symptoms.          Neuro Re-Ed/Balance  Total Minutes: 20    Patient educated on positional testing technique and purpose of recreating symptoms - patient consented. Vestibular semi-circular canal assessment with infrared goggle, fixation removed:   Left Annelise Hallpike: negative  Right [place] Hallpike: positive for right torsional nystagmus followed by non fatiguing upbeating nystagmus, mild symptoms  Left Supine Roll Test (HC): negative  Right Supine Roll Test (HC): negative  Patient educated on R PC BPPV findings.     Patient instructed in post maneuver modification for 48 hours and all questions addressed.   Pt instructed in and reviewed her current HEP listed below: pt had difficulty performing tandem walking and staggered a few steps. VC for abdominal and glut engagement.    Semi-tandem/tandem stance with PNF pattern x 5 reps each direction. VC for abdominal engagement    Current HEP:  -gaze stab x 1- busy background  Progression: busy background and walking to/from target  -gaze stab x 2 - blank background  Progression: busy background  -Semi tandem stance:   kayaking, PNF  -tandem walking forwards and backwards  -walking with horizontal head turns        Canalith Repositioning Procedure  Total Minutes: 15     Treatment for R PC BPPV. Education on maneuver and purpose of maneuver. Patient consented to treatment. Liberatory maneuver performed to R PC for cupulolithiasis BPPV x 2 cycle with 2 minute holds in each position after symptoms subsided.  Two minute rest  break between cycles in order to manage dizziness         Assessment/Plan:  Patient's dizziness symptoms have further improved and there was less nystagmus during canal testing. Her left side appears to be cleared, however she mild R PC cupulolthiasis BPPV which was treated with 2 cycles of the Liberatory manuever. Patient was reeducated on her HEP including balance activities to further advance her balance.  Patient is currently independent with her HEP to self manage her unilateral vestibular hypofunction. Patient will return if her positional dizziness does not resolve, otherwise this will be considered her discharge.         Additional Notes:  DHI: 40/100 (moderate handicap) on 6/20/2016  DHI: 8/100 (no handicap) on 9/13/2016        New functional Goals: to be met by 6 additional visits (15 total)  Pt will...  1. Report 1/10 dizziness with positional changes in order to ease daily activities.  2. Be independent with HEP to self manage lingering symptoms.

## 2021-06-08 NOTE — PROGRESS NOTES
Physical Therapy  Physical Therapy Daily Outpatient Documentation      Rx Units: 1- NR 1- CRP   Total OP Minutes: 30  Treatment #:13/15 (5/6) - [facility]     Pain: 2/10  Location: dizziness  Intervention: see below     Subjective: Patient reports improvements in her dizziness, however she continues to experience symptoms after significant movements including spinning sensations that lingers into unsteadiness for a few hours.         Neuro Re-Ed/Balance  Total Minutes: 15    Patient educated on positional testing technique and purpose of recreating symptoms - patient consented. Vestibular semi-circular canal assessment with infrared goggle, fixation removed:   Left [place] Hallpike: positive for non fatiguing left torsional upbeating nystagmus; moderate symptoms provoked  Right Annelise Hallpike: positive for initial right torsional nystagmus followed by non fatiguing upbeating nystagmus, mild symptoms  Left Supine Roll Test (HC): negative  Right Supine Roll Test (HC): negative  Patient educated on L PC BPPV findings.     Patient instructed in post maneuver modification for 48 hours and all questions addressed.        Current HEP:  -gaze stab x 1- busy background  Progression: busy background and walking to/from target  -gaze stab x 2 - blank background  Progression: busy background  -Semi tandem stance:   kayaking, PNF  -tandem walking forwards and backwards  -walking with horizontal head turns        Canalith Repositioning Procedure  Total Minutes: 15     Treatment for L PC BPPV. Education on maneuver and purpose of maneuver. Patient consented to treatment. Liberatory maneuver performed to L PC for cupulolithiasis BPPV x 2 cycle with 2 minute holds in each position after symptoms subsided.  Two minute rest break between cycles in order to manage dizziness         Assessment/Plan:  Patient continues to demonstrate nystagmus with [place] Hallpike testing and was treated with 2 cycles of the Liberatory maneuver with  decreased symptoms post. Patient does report decreased dizziness and additional treatment and assessment is indicated for next session. R canal may be treated if symptoms continue.  Will add balance activities on unstable surfaces next session.           Additional Notes:  DHI: 40/100 (moderate handicap) on 6/20/2016  DHI: 8/100 (no handicap) on 9/13/2016        New functional Goals: to be met by 6 additional visits (15 total)  Pt will...  1. Report 1/10 dizziness with positional changes in order to ease daily activities.  2. Be independent with HEP to self manage lingering symptoms.

## 2021-06-14 ENCOUNTER — TELEPHONE (OUTPATIENT)
Dept: GASTROENTEROLOGY | Facility: CLINIC | Age: 56
End: 2021-06-14

## 2021-06-14 NOTE — TELEPHONE ENCOUNTER
LVM for patient to call back to schedule follow up with Dr. Zimmerman in the GI clinic.    Brice Cuevas LPN

## 2021-07-20 ENCOUNTER — THERAPY VISIT (OUTPATIENT)
Dept: PHYSICAL THERAPY | Facility: CLINIC | Age: 56
End: 2021-07-20
Payer: COMMERCIAL

## 2021-07-20 DIAGNOSIS — M62.89 PELVIC FLOOR DYSFUNCTION: ICD-10-CM

## 2021-07-20 DIAGNOSIS — R33.9 INCOMPLETE BLADDER EMPTYING: ICD-10-CM

## 2021-07-20 PROCEDURE — 97140 MANUAL THERAPY 1/> REGIONS: CPT | Mod: GP | Performed by: PHYSICAL THERAPIST

## 2021-07-20 PROCEDURE — 97530 THERAPEUTIC ACTIVITIES: CPT | Mod: GP | Performed by: PHYSICAL THERAPIST

## 2021-09-04 ENCOUNTER — HEALTH MAINTENANCE LETTER (OUTPATIENT)
Age: 56
End: 2021-09-04

## 2021-09-24 ENCOUNTER — PATIENT OUTREACH (OUTPATIENT)
Dept: GASTROENTEROLOGY | Facility: CLINIC | Age: 56
End: 2021-09-24

## 2021-10-11 NOTE — PROGRESS NOTES
"Chief Complaint - oral pain, snoring    History of Present Illness - Iman Lee is a 56 year old female presents with burning mouth symptoms. The patient notes \"cuts on tongue\" and palate pain. The patient has noticed for approximately a year or more. She has decreased citrus or spicy food. No bleeding. Nonsmoker, and no history of chewing tobacco. No lumps or swollen glands in the neck. It is better in the morning. Worse after eating. She has heartburn. She uses bland diet. She tried prilosec 6-7 years ago. She is going through menopause now.     She snores. She denies apnea. She is tired a lot during the day. She has dry mouth. She mouth-breaths at night. Uses trazodone. Last sleep study was 10 years ago. Nothing was recommended. TSH, CBC from 8/5/21 reviewed and normal. Lyme screen was negative.    Past Medical History -   Patient Active Problem List   Diagnosis     iamENTHESOPATHY OF HIP     Nonallopathic lesion of lower extremities     Nonallopathic lesion of sacral region     Hip pain     Hip joint pain     Abnormal gait     Pelvic floor dysfunction     Incomplete bladder emptying       Current Medications -   Current Outpatient Medications:      albuterol (PROAIR HFA/PROVENTIL HFA/VENTOLIN HFA) 108 (90 Base) MCG/ACT inhaler, INHALE 8 PUFFS PER EMERGENCY ACTION PLAN. USE WITH SPACER., Disp: , Rfl:      cetirizine (ZYRTEC) 10 MG tablet, Take 10 mg by mouth, Disp: , Rfl:      citalopram (CELEXA) 20 MG tablet, , Disp: , Rfl:      dicyclomine (BENTYL) 10 MG capsule, Take 1 capsule (10 mg) by mouth 4 times daily as needed (abdominal pain) (Patient not taking: Reported on 2/16/2021), Disp: 240 capsule, Rfl: 3     estradiol (VAGIFEM) 10 MCG TABS vaginal tablet, INSERT 1 TABLET INTO THE VAGINA EVERY MONDAY AND THURSDAY., Disp: , Rfl:      IBUPROFEN PO, , Disp: , Rfl:      multivitamin w/minerals (THERA-VIT-M) tablet, Take 1 tablet by mouth, Disp: , Rfl:      traZODone (DESYREL) 25 MG TABS, Take 150 mg by mouth " At Bedtime., Disp: , Rfl:     Allergies -   Allergies   Allergen Reactions     Erythromycin      Iodine      Penicillins      Percocet [Oxycodone-Acetaminophen]      Latex Rash       Social History -   Social History     Socioeconomic History     Marital status:      Spouse name: Not on file     Number of children: Not on file     Years of education: Not on file     Highest education level: Not on file   Occupational History     Not on file   Tobacco Use     Smoking status: Never Smoker     Smokeless tobacco: Never Used   Substance and Sexual Activity     Alcohol use: Yes     Comment: 1 glass 3x4 times a week     Drug use: No     Sexual activity: Yes     Partners: Male     Birth control/protection: Female Surgical   Other Topics Concern     Parent/sibling w/ CABG, MI or angioplasty before 65F 55M? Not Asked   Social History Narrative     Not on file     Social Determinants of Health     Financial Resource Strain:      Difficulty of Paying Living Expenses:    Food Insecurity:      Worried About Running Out of Food in the Last Year:      Ran Out of Food in the Last Year:    Transportation Needs:      Lack of Transportation (Medical):      Lack of Transportation (Non-Medical):    Physical Activity:      Days of Exercise per Week:      Minutes of Exercise per Session:    Stress:      Feeling of Stress :    Social Connections:      Frequency of Communication with Friends and Family:      Frequency of Social Gatherings with Friends and Family:      Attends Christian Services:      Active Member of Clubs or Organizations:      Attends Club or Organization Meetings:      Marital Status:    Intimate Partner Violence:      Fear of Current or Ex-Partner:      Emotionally Abused:      Physically Abused:      Sexually Abused:        Family History -   Family History   Problem Relation Age of Onset     Hypertension Mother      Osteoporosis Mother      Hypertension Father      Hyperlipidemia Father      Hyperlipidemia  Mother      No Known Problems Sister      No Known Problems Brother      Physical Exam  /87   Pulse 84   Resp 16   Wt 79.4 kg (175 lb)   SpO2 99%   BMI 26.61 kg/m    General - The patient is in no distress. Alert and oriented x3, answers questions and cooperates with examination appropriately.   Voice and Breathing - The patient was breathing comfortably without the use of accessory muscles. There was no wheezing, stridor, or stertor.  The patients voice was clear and strong.  Eyes - Extraocular movements intact. Sclera were not icteric or injected, conjunctiva were pink and moist.  Neurologic - Cranial nerves II-XII are grossly intact. Specifically, the facial nerve is intact, House-Brackmann grade 1 of 6.   Mouth - Examination of the oral cavity showed no patricio lesions. Small punctate red spots, likely inflamed taste buds anteriorly. No other lesions noted. The tongue was mobile and protrudes midline.   Oropharynx - mallampati 2 of 4. Tonsils 0-1+. The walls of the oropharynx were smooth, symmetric, and had no lesions or ulcerations.    Neck -  Soft. Non-tender. Palpation of the occipital, submental, submandibular, internal jugular chain, and supraclavicular nodes did not demonstrate any abnormal lymph nodes or masses. The parotid glands were without masses. Palpation of the thyroid was soft and smooth, with no nodules or goiter appreciated.  The trachea was midline.  Nose - septum very deviated to the right. Left airway more open, but still some turbinate hypertrophy both sides.       A/P - Iman Lee is a 56 year old female with tongue and palate pain for over a year.  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  She has a few risk factors including being perimenopausal, having laryngopharyngeal reflux disease, and snoring with dry mouth.  She is trying an estrogen patch is helping some.  I recommend treating the acid reflux with Prilosec 20 mg daily.  I will also refer her to  sleep medicine for a sleep study.  She has risk factors for sleep apnea and it runs in her family.  She can try Biotene to help with mouth dryness as well.  Return if this fails.    Rai Kebede MD  Otolaryngology  [facility]

## 2021-10-12 ENCOUNTER — OFFICE VISIT (OUTPATIENT)
Dept: OTOLARYNGOLOGY | Facility: CLINIC | Age: 56
End: 2021-10-12
Payer: COMMERCIAL

## 2021-10-12 VITALS
WEIGHT: 175 LBS | DIASTOLIC BLOOD PRESSURE: 87 MMHG | RESPIRATION RATE: 16 BRPM | HEART RATE: 84 BPM | BODY MASS INDEX: 26.61 KG/M2 | SYSTOLIC BLOOD PRESSURE: 130 MMHG | OXYGEN SATURATION: 99 %

## 2021-10-12 DIAGNOSIS — K21.9 LPRD (LARYNGOPHARYNGEAL REFLUX DISEASE): ICD-10-CM

## 2021-10-12 DIAGNOSIS — K14.6 BURNING MOUTH SYNDROME: Primary | ICD-10-CM

## 2021-10-12 DIAGNOSIS — R06.83 SNORING: ICD-10-CM

## 2021-10-12 PROCEDURE — 99204 OFFICE O/P NEW MOD 45 MIN: CPT | Performed by: OTOLARYNGOLOGY

## 2021-10-12 NOTE — LETTER
"    10/12/2021         RE: Iman Lee  [address]  [place] [address]        Dear Colleague,    Thank you for referring your patient, Iman Lee, to the [facility]. Please see a copy of my visit note below.    Chief Complaint - oral pain, snoring    History of Present Illness - Iman Lee is a 56 year old female presents with burning mouth symptoms. The patient notes \"cuts on tongue\" and palate pain. The patient has noticed for approximately a year or more. She has decreased citrus or spicy food. No bleeding. Nonsmoker, and no history of chewing tobacco. No lumps or swollen glands in the neck. It is better in the morning. Worse after eating. She has heartburn. She uses bland diet. She tried prilosec 6-7 years ago. She is going through menopause now.     She snores. She denies apnea. She is tired a lot during the day. She has dry mouth. She mouth-breaths at night. Uses trazodone. Last sleep study was 10 years ago. Nothing was recommended. TSH, CBC from 8/5/21 reviewed and normal. Lyme screen was negative.    Past Medical History -   Patient Active Problem List   Diagnosis     iamENTHESOPATHY OF HIP     Nonallopathic lesion of lower extremities     Nonallopathic lesion of sacral region     Hip pain     Hip joint pain     Abnormal gait     Pelvic floor dysfunction     Incomplete bladder emptying       Current Medications -   Current Outpatient Medications:      albuterol (PROAIR HFA/PROVENTIL HFA/VENTOLIN HFA) 108 (90 Base) MCG/ACT inhaler, INHALE 8 PUFFS PER EMERGENCY ACTION PLAN. USE WITH SPACER., Disp: , Rfl:      cetirizine (ZYRTEC) 10 MG tablet, Take 10 mg by mouth, Disp: , Rfl:      citalopram (CELEXA) 20 MG tablet, , Disp: , Rfl:      dicyclomine (BENTYL) 10 MG capsule, Take 1 capsule (10 mg) by mouth 4 times daily as needed (abdominal pain) (Patient not taking: Reported on 2/16/2021), Disp: 240 capsule, Rfl: 3     estradiol (VAGIFEM) 10 MCG TABS vaginal " tablet, INSERT 1 TABLET INTO THE VAGINA EVERY MONDAY AND THURSDAY., Disp: , Rfl:      IBUPROFEN PO, , Disp: , Rfl:      multivitamin w/minerals (THERA-VIT-M) tablet, Take 1 tablet by mouth, Disp: , Rfl:      traZODone (DESYREL) 25 MG TABS, Take 150 mg by mouth At Bedtime., Disp: , Rfl:     Allergies -   Allergies   Allergen Reactions     Erythromycin      Iodine      Penicillins      Percocet [Oxycodone-Acetaminophen]      Latex Rash       Social History -   Social History     Socioeconomic History     Marital status:      Spouse name: Not on file     Number of children: Not on file     Years of education: Not on file     Highest education level: Not on file   Occupational History     Not on file   Tobacco Use     Smoking status: Never Smoker     Smokeless tobacco: Never Used   Substance and Sexual Activity     Alcohol use: Yes     Comment: 1 glass 3x4 times a week     Drug use: No     Sexual activity: Yes     Partners: Male     Birth control/protection: Female Surgical   Other Topics Concern     Parent/sibling w/ CABG, MI or angioplasty before 65F 55M? Not Asked   Social History Narrative     Not on file     Social Determinants of Health     Financial Resource Strain:      Difficulty of Paying Living Expenses:    Food Insecurity:      Worried About Running Out of Food in the Last Year:      Ran Out of Food in the Last Year:    Transportation Needs:      Lack of Transportation (Medical):      Lack of Transportation (Non-Medical):    Physical Activity:      Days of Exercise per Week:      Minutes of Exercise per Session:    Stress:      Feeling of Stress :    Social Connections:      Frequency of Communication with Friends and Family:      Frequency of Social Gatherings with Friends and Family:      Attends [employer]ab[employer] Services:      Active Member of Clubs or Organizations:      Attends Club or Organization Meetings:      Marital Status:    Intimate Partner Violence:      Fear of Current or Ex-Partner:       Emotionally Abused:      Physically Abused:      Sexually Abused:        Family History -   Family History   Problem Relation Age of Onset     Hypertension Mother      Osteoporosis Mother      Hypertension Father      Hyperlipidemia Father      Hyperlipidemia Mother      No Known Problems Sister      No Known Problems Brother      Physical Exam  /87   Pulse 84   Resp 16   Wt 79.4 kg (175 lb)   SpO2 99%   BMI 26.61 kg/m    General - The patient is in no distress. Alert and oriented x3, answers questions and cooperates with examination appropriately.   Voice and Breathing - The patient was breathing comfortably without the use of accessory muscles. There was no wheezing, stridor, or stertor.  The patients voice was clear and strong.  Eyes - Extraocular movements intact. Sclera were not icteric or injected, conjunctiva were pink and moist.  Neurologic - Cranial nerves II-XII are grossly intact. Specifically, the facial nerve is intact, House-Brackmann grade 1 of 6.   Mouth - Examination of the oral cavity showed no patricio lesions. Small punctate red spots, likely inflamed taste buds anteriorly. No other lesions noted. The tongue was mobile and protrudes midline.   Oropharynx - mallampati 2 of 4. Tonsils 0-1+. The walls of the oropharynx were smooth, symmetric, and had no lesions or ulcerations.    Neck -  Soft. Non-tender. Palpation of the occipital, submental, submandibular, internal jugular chain, and supraclavicular nodes did not demonstrate any abnormal lymph nodes or masses. The parotid glands were without masses. Palpation of the thyroid was soft and smooth, with no nodules or goiter appreciated.  The trachea was midline.  Nose - septum very deviated to the right. Left airway more open, but still some turbinate hypertrophy both sides.       A/P - Iman Lee is a 56 year old female with tongue and palate pain for over a year.  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  She has a few  risk factors including being perimenopausal, having laryngopharyngeal reflux disease, and snoring with dry mouth.  She is trying an estrogen patch is helping some.  I recommend treating the acid reflux with Prilosec 20 mg daily.  I will also refer her to sleep medicine for a sleep study.  She has risk factors for sleep apnea and it runs in her family.  She can try Biotene to help with mouth dryness as well.  Return if this fails.    Rai Kebede MD  Otolaryngology  [facility]        Again, thank you for allowing me to participate in the care of your patient.        Sincerely,        Rai Kebede MD

## 2021-10-14 NOTE — PROGRESS NOTES
[facility] Outpatient Medical Nutrition Therapy      Iman is a 56 year old who is being evaluated via a billable video visit.      How would you like to obtain your AVS? MyChart  If the video visit is dropped, the invitation should be resent by: Send to e-mail at: [email or web address]  Will anyone else be joining your video visit? No      Video Start Time: 10:04 AM     Video-Visit Details    Type of service:  Video Visit    Video End Time:10:57 AM    Originating Location (pt. Location): Home    Distant Location (provider location):  [facility] [place]     Platform used for Video Visit: [email or web address]    During this virtual visit the patient is located in [place], patient verifies this as the location during the entirety of this visit.     Ian Austin, PhD, RD    Additional provider notes:  Referring Physician: Erasmo  Reason for RD Visit: IBS     Nutrition Plan: Lower fat diet with increased intake of soluble-fiber rich food sources; Limit intake of caffeine and alcohol    Recommendations for MD/Provider to order: None at this time    Malnutrition Diagnosis: Patient does not meet the criteria necessary for diagnosing malnutrition    Nutrition Assessment:  Patient is here for intial visit with Registered Dietitian (RD). Patient is a 56 year old female followed by Dr. Zimmerman for intermittent episodes of left lower quadrant pain and diverticulosis. She was most recently seen by Dr. Zimmerman 2/16/2021 at which time she had trialed fiber supplementation, but ultimately found best symptom management with a low fiber diet. Reports undergoing hysterectomy since meeting with Dr. Zimmerman, which she notes has led to improvement in intermittent episodes of abdominal pain. Her primary concerns today include questions regarding how to manage symptoms of GERD and overall gut health. She notes that she recently completed a food sensitivity test that told her she was sensitive to eggs. She is also concerned  "with recent weight gain of ~10 pounds that occurred while on estrogen supplementation. She reports maintaining an active lifestyle.    Symptom Review  1. Nausea/vomiting? No  2. Heartburn? Yes: Frequent: seems to be related to spicy/acidic foods; recently started on prilosec; symptoms primarily limited to after meals  3. Bloating? No  4. Belching? NA  5. Feeling full quickly? No  6. Decreased appetite? No  7. Weight loss/gain? Yes: Recent weight gain  8. Constipation/Diarrhea? Yes: Sometimes diarrhea if \"bout\" of irritable bowel which occurs ~1 time/week  9. Urgency? NA  10. Incomplete Bowel Emptying? NA  11. Abdominal pain/pain with or without eating? No  12. Feeling tired? Yes: Struggles with being tired    Dietary beliefs and Practices  1.  Have you modified your diet since symptoms began?  Yes Has transitioned to a mostly gluten free diet with limited intake of tomato/spicy foods  2.  Do you believe that food/nutrition is an important part of your symptom management? NA  3.  Are there specific foods you avoid? No  4.  Are there specific foods or food groups that you feel are better tolerated? Yes: Low fiber; \"super bland\"  5.  Do you avoid some foods or food groups for fear of worsening symptoms? NA  6.  Have you received prior advice on diet?  No   A. If so, source? NA  7.  Have you, or do you follow, a specific diet?  Yes   A. Which one(s)?  Keto \"on and off\"   B. Did/Do you find it beneficial?  NA    I. If able to articulate, what specifically is the benefit? NA  8.  Do you take any vitamin, mineral, or herbal supplements? Yes: Multivitamin, calcium, fish oil  9.  Do you use any calorie/protein supplements?  No    Diet Recall:  (Typical Day)  Meal Name Time Food    Breakfast  Yogurt with blueberries or fruit OR PB toast OR sauteed spinach and mushrooms; coffee with milk (1 large cup/day)           Almonds OR 1/2 avocado        Lunch  Salad for lunch with some kind of meat (chicken/prosciutto)/cheese/veggies " and water to drink          Olives and celery        Dinner  Protein (chicken/beef/sausage) and vegetables        Snacks  See above   Beverages  Primarily water   Alcohol   Glass of wine at dinner most nights     Frequency of eating/taking out meals: 1-2/week (chipotle; tries to stick to meat and vegetables)  Food access/availability: Fine  Food preparation confidence/abilities: Fine    Anthropometrics:   Height: Data Unavailable  Weight: 175 lb  BMI: 26.61 kg/m     Weight History:  Wt Readings from Last 10 Encounters:   10/12/21 79.4 kg (175 lb)   21 74.8 kg (165 lb)   11/10/20 74.8 kg (165 lb)   05/15/19 74.8 kg (165 lb)   17 74.8 kg (165 lb 0.2 oz)   09/01/15 78 kg (172 lb)   13 72.6 kg (160 lb)      Usual Weight: 165 pounds  Weight change in past 6 months: Increased ~10 pounds    Labs: Reviewed  Pertinent Medications/vitamin and mineral supplements:   Current Outpatient Medications   Medication     albuterol (PROAIR HFA/PROVENTIL HFA/VENTOLIN HFA) 108 (90 Base) MCG/ACT inhaler     cetirizine (ZYRTEC) 10 MG tablet     citalopram (CELEXA) 20 MG tablet     dicyclomine (BENTYL) 10 MG capsule     estradiol (VAGIFEM) 10 MCG TABS vaginal tablet     IBUPROFEN PO     multivitamin w/minerals (THERA-VIT-M) tablet     omeprazole (PRILOSEC) 20 MG DR capsule     traZODone (DESYREL) 25 MG TABS     No current facility-administered medications for this visit.       Food Allergies: None  Food Intolerances: Eggs; tomatoes; peppers  Physical Activity: Weight train 2 days/week; walks/hikes; bikes; golfs  Estimated Nutrition Needs based on most recent body weight of 79.4 k calories (25 kcals/kg), 80 g protein (1.0 g/kg), ~1 ml/kcal or total fluids per MD    MALNUTRITION:  % Weight Loss:  None noted  % Intake:  No decreased intake noted  Subcutaneous Fat Loss:  None observed  Muscle Loss:  None observed  Fluid Retention:  None noted    Nutrition Diagnosis:    Food and nutrition related knowledge deficit  related to IBD as evidenced by need for diet education.    Nutrition Prescription: Regular diet    Nutrition Intervention:    Nutrition Education/Counseling:  Discussed diet and symptom history. Discussed increasing intake of prebiotic/soluble fiber containing foods (e.g. ground flaxseed, chele seeds) and consuming a more consistent intake of fiber throughout the day to support gut health. Discussed use of plant-based proteins sources to help increase fiber intake and aid with satiety at lunch. Discussed the role fiber in managing diverticulosis. Long-term, getting enough fiber is key. Though some recommended this in the past, no evidence supports avoiding certain foods like seeds, nuts and popcorn. Fiber helps to bulk up the stool so that it moves more easily through the colon and out of the body. An adequate intake of foods high in fiber will not prevent diverticulosis. It will, however, help to reduce the risk of diverticulitis flare-ups.    Discussed diet recommendations for GERD. Identified high fat meats and sources of caffeine and alcohol in the diet as possible contributors to symptoms.    Discussed recent weight gain and potential opportunities to decrease calorie intake via limiting intake of high fat meats and alcohol.    Educational Materials Provided: None at this time  Patient verbalized understanding of education provided. See all recommendations under Goals.    Goals:  1. Try to include a fiber source with each meal as a way to ensure a regular, increased fiber intake to support gut health. We specifically discussed increasing intake of soft-texture, soluble fiber rich foods    2. Lower-fat diet. Emphasize avoidance of greasy or fried foods such as high fat meats    3. Limit or avoid caffeine    4. Limit or avoid alcohol    Nutrition Monitoring and Evaluation: Will monitor adherence to nutrition recommendations at future RD visits.     Further Medical Nutrition Therapy: Yes  Next Appointment (if  applicable): 3 months  Patient was encouraged to call/contact RD with any further questions.

## 2021-10-18 ENCOUNTER — VIRTUAL VISIT (OUTPATIENT)
Dept: GASTROENTEROLOGY | Facility: CLINIC | Age: 56
End: 2021-10-18
Payer: COMMERCIAL

## 2021-10-18 DIAGNOSIS — Z71.3 NUTRITIONAL COUNSELING: ICD-10-CM

## 2021-10-18 DIAGNOSIS — K57.30 DIVERTICULOSIS OF LARGE INTESTINE WITHOUT HEMORRHAGE: ICD-10-CM

## 2021-10-18 DIAGNOSIS — K58.8 OTHER IRRITABLE BOWEL SYNDROME: Primary | ICD-10-CM

## 2021-10-18 PROCEDURE — 97802 MEDICAL NUTRITION INDIV IN: CPT | Mod: GT | Performed by: DIETITIAN, REGISTERED

## 2021-10-18 NOTE — LETTER
10/18/2021         RE: Iman Lee  [address]  [place] [facility] [address]        Dear Colleague,    Thank you for referring your patient, Iman Lee, to the [facility]STRO[facility] [place]. Please see a copy of my visit note below.    [facility] Outpatient Medical Nutrition Therapy      Referring Physician: rEasmo  Reason for RD Visit: IBS     Nutrition Plan: Lower fat diet with increased intake of soluble-fiber rich food sources; Limit intake of caffeine and alcohol    Recommendations for MD/Provider to order: None at this time    Malnutrition Diagnosis: Patient does not meet the criteria necessary for diagnosing malnutrition    Nutrition Assessment:  Patient is here for intial visit with Registered Dietitian (RD). Patient is a 56 year old female followed by Dr. Zimmerman for intermittent episodes of left lower quadrant pain and diverticulosis. She was most recently seen by Dr. Zimmerman 2/16/2021 at which time she had trialed fiber supplementation, but ultimately found best symptom management with a low fiber diet. Reports undergoing hysterectomy since meeting with Dr. Zimmerman, which she notes has led to improvement in intermittent episodes of abdominal pain. Her primary concerns today include questions regarding how to manage symptoms of GERD and overall gut health. She notes that she recently completed a food sensitivity test that told her she was sensitive to eggs. She is also concerned with recent weight gain of ~10 pounds that occurred while on estrogen supplementation. She reports maintaining an active lifestyle.    Symptom Review  1. Nausea/vomiting? No  2. Heartburn? Yes: Frequent: seems to be related to spicy/acidic foods; recently started on prilosec; symptoms primarily limited to after meals  3. Bloating? No  4. Belching? NA  5. Feeling full quickly? No  6. Decreased appetite? No  7. Weight loss/gain? Yes: Recent weight gain  8. Constipation/Diarrhea? Yes: Sometimes  "diarrhea if \"bout\" of irritable bowel which occurs ~1 time/week  9. Urgency? NA  10. Incomplete Bowel Emptying? NA  11. Abdominal pain/pain with or without eating? No  12. Feeling tired? Yes: Struggles with being tired    Dietary beliefs and Practices  1.  Have you modified your diet since symptoms began?  Yes Has transitioned to a mostly gluten free diet with limited intake of tomato/spicy foods  2.  Do you believe that food/nutrition is an important part of your symptom management? NA  3.  Are there specific foods you avoid? No  4.  Are there specific foods or food groups that you feel are better tolerated? Yes: Low fiber; \"super bland\"  5.  Do you avoid some foods or food groups for fear of worsening symptoms? NA  6.  Have you received prior advice on diet?  No   A. If so, source? NA  7.  Have you, or do you follow, a specific diet?  Yes   A. Which one(s)?  Keto \"on and off\"   B. Did/Do you find it beneficial?  NA    I. If able to articulate, what specifically is the benefit? NA  8.  Do you take any vitamin, mineral, or herbal supplements? Yes: Multivitamin, calcium, fish oil  9.  Do you use any calorie/protein supplements?  No    Diet Recall:  (Typical Day)  Meal Name Time Food    Breakfast  Yogurt with blueberries or fruit OR PB toast OR sauteed spinach and mushrooms; coffee with milk (1 large cup/day)           Almonds OR 1/2 avocado        Lunch  Salad for lunch with some kind of meat (chicken/prosciutto)/cheese/veggies and water to drink          Olives and celery        Dinner  Protein (chicken/beef/sausage) and vegetables        Snacks  See above   Beverages  Primarily water   Alcohol   Glass of wine at dinner most nights     Frequency of eating/taking out meals: 1-2/week (chipotle; tries to stick to meat and vegetables)  Food access/availability: Fine  Food preparation confidence/abilities: Fine    Anthropometrics:   Height: Data Unavailable  Weight: 175 lb  BMI: 26.61 kg/m     Weight History:  Wt " Readings from Last 10 Encounters:   10/12/21 79.4 kg (175 lb)   21 74.8 kg (165 lb)   11/10/20 74.8 kg (165 lb)   05/15/19 74.8 kg (165 lb)   17 74.8 kg (165 lb 0.2 oz)   09/01/15 78 kg (172 lb)   13 72.6 kg (160 lb)      Usual Weight: 165 pounds  Weight change in past 6 months: Increased ~10 pounds    Labs: Reviewed  Pertinent Medications/vitamin and mineral supplements:   Current Outpatient Medications   Medication     albuterol (PROAIR HFA/PROVENTIL HFA/VENTOLIN HFA) 108 (90 Base) MCG/ACT inhaler     cetirizine (ZYRTEC) 10 MG tablet     citalopram (CELEXA) 20 MG tablet     dicyclomine (BENTYL) 10 MG capsule     estradiol (VAGIFEM) 10 MCG TABS vaginal tablet     IBUPROFEN PO     multivitamin w/minerals (THERA-VIT-M) tablet     omeprazole (PRILOSEC) 20 MG DR capsule     traZODone (DESYREL) 25 MG TABS     No current facility-administered medications for this visit.       Food Allergies: None  Food Intolerances: Eggs; tomatoes; peppers  Physical Activity: Weight train 2 days/week; walks/hikes; bikes; golfs  Estimated Nutrition Needs based on most recent body weight of 79.4 k calories (25 kcals/kg), 80 g protein (1.0 g/kg), ~1 ml/kcal or total fluids per MD    MALNUTRITION:  % Weight Loss:  None noted  % Intake:  No decreased intake noted  Subcutaneous Fat Loss:  None observed  Muscle Loss:  None observed  Fluid Retention:  None noted    Nutrition Diagnosis:    Food and nutrition related knowledge deficit related to IBD as evidenced by need for diet education.    Nutrition Prescription: Regular diet    Nutrition Intervention:    Nutrition Education/Counseling:  Discussed diet and symptom history. Discussed increasing intake of prebiotic/soluble fiber containing foods (e.g. ground flaxseed, chele seeds) and consuming a more consistent intake of fiber throughout the day to support gut health. Discussed use of plant-based proteins sources to help increase fiber intake and aid with satiety at  lunch. Discussed the role fiber in managing diverticulosis. Long-term, getting enough fiber is key. Though some recommended this in the past, no evidence supports avoiding certain foods like seeds, nuts and popcorn. Fiber helps to bulk up the stool so that it moves more easily through the colon and out of the body. An adequate intake of foods high in fiber will not prevent diverticulosis. It will, however, help to reduce the risk of diverticulitis flare-ups.    Discussed diet recommendations for GERD. Identified high fat meats and sources of caffeine and alcohol in the diet as possible contributors to symptoms.    Discussed recent weight gain and potential opportunities to decrease calorie intake via limiting intake of high fat meats and alcohol.    Educational Materials Provided: None at this time  Patient verbalized understanding of education provided. See all recommendations under Goals.    Goals:  1. Try to include a fiber source with each meal as a way to ensure a regular, increased fiber intake to support gut health. We specifically discussed increasing intake of soft-texture, soluble fiber rich foods    2. Lower-fat diet. Emphasize avoidance of greasy or fried foods such as high fat meats    3. Limit or avoid caffeine    4. Limit or avoid alcohol    Nutrition Monitoring and Evaluation: Will monitor adherence to nutrition recommendations at future RD visits.     Further Medical Nutrition Therapy: Yes  Next Appointment (if applicable): 3 months  Patient was encouraged to call/contact RD with any further questions.        Again, thank you for allowing me to participate in the care of your patient.      Sincerely,    Ian Austin RD

## 2021-10-18 NOTE — PATIENT INSTRUCTIONS
Larry Valerio,    It was great meeting you today. Below is a summary of what we discussed:    1. Try to include a fiber source with each meal as a way to ensure a regular, increased fiber intake to support gut health. We specifically discussed increasing intake of soft-texture, soluble fiber rich foods    2. Lower-fat diet. Emphasize avoidance of greasy or fried foods such as high fat meats    3. Limit or avoid caffeine    4. Limit or avoid alcohol    Best regards,  Ian Austin, PhD, RD

## 2021-11-05 DIAGNOSIS — K14.6 BURNING MOUTH SYNDROME: ICD-10-CM

## 2021-11-05 DIAGNOSIS — K21.9 LPRD (LARYNGOPHARYNGEAL REFLUX DISEASE): ICD-10-CM

## 2021-11-05 NOTE — TELEPHONE ENCOUNTER
Pending Prescriptions:                       Disp   Refills    omeprazole (PRILOSEC) 20 MG DR capsule [P*30 cap*3            Sig: TAKE 1 CAPSULE BY MOUTH EVERY DAY    RN refilled medication per [facility] Refill Protocol.       Mehreen CONDE RN, [facility] 11/05/21 1:42 PM

## 2021-12-03 ENCOUNTER — MYC MEDICAL ADVICE (OUTPATIENT)
Dept: SLEEP MEDICINE | Facility: CLINIC | Age: 56
End: 2021-12-03
Payer: COMMERCIAL

## 2021-12-08 ASSESSMENT — SLEEP AND FATIGUE QUESTIONNAIRES
HOW LIKELY ARE YOU TO NOD OFF OR FALL ASLEEP WHILE SITTING QUIETLY AFTER LUNCH WITHOUT ALCOHOL: MODERATE CHANCE OF DOZING
HOW LIKELY ARE YOU TO NOD OFF OR FALL ASLEEP WHEN YOU ARE A PASSENGER IN A CAR FOR AN HOUR WITHOUT A BREAK: SLIGHT CHANCE OF DOZING
HOW LIKELY ARE YOU TO NOD OFF OR FALL ASLEEP WHILE SITTING INACTIVE IN A PUBLIC PLACE: SLIGHT CHANCE OF DOZING
HOW LIKELY ARE YOU TO NOD OFF OR FALL ASLEEP WHILE SITTING AND READING: SLIGHT CHANCE OF DOZING
HOW LIKELY ARE YOU TO NOD OFF OR FALL ASLEEP IN A CAR, WHILE STOPPED FOR A FEW MINUTES IN TRAFFIC: WOULD NEVER DOZE
HOW LIKELY ARE YOU TO NOD OFF OR FALL ASLEEP WHILE SITTING AND TALKING TO SOMEONE: SLIGHT CHANCE OF DOZING
HOW LIKELY ARE YOU TO NOD OFF OR FALL ASLEEP WHILE WATCHING TV: SLIGHT CHANCE OF DOZING
HOW LIKELY ARE YOU TO NOD OFF OR FALL ASLEEP WHEN YOU ARE A PASSENGER IN A CAR FOR AN HOUR WITHOUT A BREAK: SLIGHT CHANCE OF DOZING
HOW LIKELY ARE YOU TO NOD OFF OR FALL ASLEEP WHILE SITTING QUIETLY AFTER LUNCH WITHOUT ALCOHOL: MODERATE CHANCE OF DOZING
HOW LIKELY ARE YOU TO NOD OFF OR FALL ASLEEP WHILE SITTING INACTIVE IN A PUBLIC PLACE: SLIGHT CHANCE OF DOZING
HOW LIKELY ARE YOU TO NOD OFF OR FALL ASLEEP WHILE LYING DOWN TO REST IN THE AFTERNOON WHEN CIRCUMSTANCES PERMIT: HIGH CHANCE OF DOZING
HOW LIKELY ARE YOU TO NOD OFF OR FALL ASLEEP IN A CAR, WHILE STOPPED FOR A FEW MINUTES IN TRAFFIC: WOULD NEVER DOZE
HOW LIKELY ARE YOU TO NOD OFF OR FALL ASLEEP WHILE SITTING AND READING: SLIGHT CHANCE OF DOZING
HOW LIKELY ARE YOU TO NOD OFF OR FALL ASLEEP WHILE WATCHING TV: SLIGHT CHANCE OF DOZING
HOW LIKELY ARE YOU TO NOD OFF OR FALL ASLEEP WHILE LYING DOWN TO REST IN THE AFTERNOON WHEN CIRCUMSTANCES PERMIT: HIGH CHANCE OF DOZING
HOW LIKELY ARE YOU TO NOD OFF OR FALL ASLEEP WHILE SITTING AND TALKING TO SOMEONE: SLIGHT CHANCE OF DOZING

## 2021-12-09 ENCOUNTER — VIRTUAL VISIT (OUTPATIENT)
Dept: SLEEP MEDICINE | Facility: CLINIC | Age: 56
End: 2021-12-09
Attending: OTOLARYNGOLOGY
Payer: COMMERCIAL

## 2021-12-09 VITALS — HEIGHT: 68 IN | WEIGHT: 170 LBS | BODY MASS INDEX: 25.76 KG/M2

## 2021-12-09 DIAGNOSIS — G25.81 RESTLESS LEGS SYNDROME (RLS): ICD-10-CM

## 2021-12-09 DIAGNOSIS — G47.09 OTHER INSOMNIA: ICD-10-CM

## 2021-12-09 DIAGNOSIS — R06.83 SNORING: Primary | ICD-10-CM

## 2021-12-09 PROCEDURE — 99204 OFFICE O/P NEW MOD 45 MIN: CPT | Mod: GT | Performed by: INTERNAL MEDICINE

## 2021-12-09 ASSESSMENT — MIFFLIN-ST. JEOR: SCORE: 1414.61

## 2021-12-09 NOTE — PROGRESS NOTES
Iman is a 56 year old who is being evaluated via a billable video visit.      How would you like to obtain your AVS? MyChart  If the video visit is dropped, the invitation should be resent by: Text to cell phone: [phone]  Will anyone else be joining your video visit? No      Video Start Time: 9:01 AM  Video-Visit Details    Type of service:  Video Visit    Video End Time:9:42 AM    Originating Location (pt. Location): Home    Distant Location (provider location):  [facility]     Platform used for Video Visit: [employer]       Chief complaint: Consultation requested by Rai Kebede for evaluation of snoring and poor sleep quality    History of Present Illness: 56-year-old female in good general health describes long history of poor quality sleep.  She has a history of sleep talking and sleepwalking in the past but not much recently.  She was evaluated many years ago but no treatments were recommended.  Snoring has developed in the last 4 years or so.  She has been told the snoring is loud and disruptive.  She also wakes herself up with a snort and mild shortness of breath.  She denies waking up with panic or palpitations.  She wakes up frequently at night and often will have difficulty returning to sleep.  This is improved in the last 5 years with the use of trazodone.  But she does suffer a lot of fatigue and sleepiness during the day.  This has been frustrating for her.      She typically goes to bed around 10-10 30 PM.  She does note that 2-3 times a week she experiences a sense of restlessness in her legs where she feels that walking helps.  She also notes this on long car rides.  When she wakes up in the middle the night she denies significant restlessness.  She will often wake up around 2 and then 430 AM.  Sometimes she cannot fall back asleep and will just get up for the day around 430 otherwise she typically gets up by 6 AM.  She will take naps maybe 2-3 times a week.  She  does wake up with headaches maybe once a week.  She often has dry mouth in the morning.  She usually drinks 2-3 caffeinated beverages in the first half of the day.  She will have 1 glass of wine maybe 3-4 times a week.  She is no longer experiencing hot flashes with estrogen supplementation.  She denies significant nocturia.  She is working from home.  Denies sleepiness behind the wheel.    Patient notes that she is hoping to get an evaluation for possible sleep apnea prior to leaving for 7 months.  She is leaving for [facility].    [place] Sleepiness Scale   Sitting and reading: Slight chance of dozing   Watching TV: Slight chance of dozing   Sitting, inactive in a public place (e.g. a theatre or a meeting): Slight chance of dozing   As a passenger in a car for an hour without a break: Slight chance of dozing   Lying down to rest in the afternoon when circumstances permit: High chance of dozing   Sitting and talking to someone: Slight chance of dozing   Sitting quietly after a lunch without alcohol: Moderate chance of dozing   In a car, while stopped for a few minutes in traffic: Would never doze   Total score - [place]: 10   (Less than 10 normal)    Insomnia Severity Scale  DANIEL Total Score: 17  Total score categories:  0-7 = No clinically significant insomnia   8-14 = Subthreshold insomnia   15-21 = Clinical insomnia (moderate severity)  22-28 = Clinical insomnia (severe)    STOP-BANG  Loud Snore   1  Excessively Tired/Sleepy   1  Observed apnea   0  Hypertension   0  BMI> 35 kg/m2   0  Age >50   1  Neck >16 in/40cm   0  Male Gender   0  Total =   3  (0-2 low, 3-4 intermediate, 5-8 high risk of ZEKE)      Past Medical History:   Diagnosis Date     Depressive disorder      Diverticulitis      IBS (irritable bowel syndrome)      Scoliosis        Allergies   Allergen Reactions     Erythromycin      Iodine      Penicillins      Percocet [Oxycodone-Acetaminophen]      Latex Rash       Current Outpatient Medications    Medication     albuterol (PROAIR HFA/PROVENTIL HFA/VENTOLIN HFA) 108 (90 Base) MCG/ACT inhaler     cetirizine (ZYRTEC) 10 MG tablet     citalopram (CELEXA) 20 MG tablet     dicyclomine (BENTYL) 10 MG capsule     estradiol (VAGIFEM) 10 MCG TABS vaginal tablet     IBUPROFEN PO     multivitamin w/minerals (THERA-VIT-M) tablet     omeprazole (PRILOSEC) 20 MG DR capsule     traZODone (DESYREL) 25 MG TABS     No current facility-administered medications for this visit.       Social History     Socioeconomic History     Marital status:      Spouse name: Not on file     Number of children: Not on file     Years of education: Not on file     Highest education level: Not on file   Occupational History     Not on file   Tobacco Use     Smoking status: Never Smoker     Smokeless tobacco: Never Used   Substance and Sexual Activity     Alcohol use: Yes     Alcohol/week: 4.0 standard drinks     Types: 4 Glasses of wine per week     Comment: 1 glass 3x4 times a week     Drug use: No     Sexual activity: Yes     Partners: Male     Birth control/protection: Female Surgical   Other Topics Concern     Parent/sibling w/ CABG, MI or angioplasty before 65F 55M? Not Asked   Social History Narrative     Not on file     Social Determinants of Health     Financial Resource Strain: Not on file   Food Insecurity: Not on file   Transportation Needs: Not on file   Physical Activity: Not on file   Stress: Not on file   Social Connections: Not on file   Intimate Partner Violence: Not on file   Housing Stability: Not on file       Family History   Problem Relation Age of Onset     Hypertension Mother      Osteoporosis Mother      Hyperlipidemia Mother      Sleep Apnea Mother      Hypertension Father      Hyperlipidemia Father      Sleep Apnea Father      No Known Problems Sister      No Known Problems Brother        Review of Systems:   Answers for HPI/ROS submitted by the patient on 12/8/2021  General Symptoms: No  Skin Symptoms: No  HENT  "Symptoms: No  EYE SYMPTOMS: No  HEART SYMPTOMS: No  LUNG SYMPTOMS: No  INTESTINAL SYMPTOMS: No  URINARY SYMPTOMS: No  GYNECOLOGIC SYMPTOMS: No  BREAST SYMPTOMS: No  SKELETAL SYMPTOMS: No  BLOOD SYMPTOMS: No  NERVOUS SYSTEM SYMPTOMS: No  MENTAL HEALTH SYMPTOMS: No        EXAM:  Ht 1.727 m (5' 8\")   Wt 77.1 kg (170 lb)   BMI 25.85 kg/m    GENERAL: Alert and no distress  EYES: Eyes grossly normal to inspection.  No discharge or erythema, or obvious scleral/conjunctival abnormalities.  RESP: No audible wheeze, cough, or visible cyanosis.  No visible retractions or increased work of breathing.    SKIN: Visible skin clear. No significant rash, abnormal pigmentation or lesions.  NEURO: Cranial nerves grossly intact.  Mentation and speech appropriate for age.  PSYCH: Mentation appears normal, affect normal, judgement and insight intact, normal speech and appearance well-groomed.       8/5/2021  TSH 1.26, Hb 13.7    ASSESSMENT:  56-year-old female with difficulty initiating and maintaining sleep somewhat improved on trazodone.  She also experiences some restless legs and snoring with arousals associated with snoring.  Possible that she has obstructive sleep apnea that could be contributing to her disruption and daytime sleepiness.  It is given mild sleep apnea is present and treatment could be beneficial for insomnia, sleep quality, motor restlessness.    PLAN:  Recommended home sleep apnea testing, will order a WatchPat as she may be able to get this completed before she leaves.  We talked about treatment options and this would be unlikely to be obtained before traveling.  However we discussed precautions that she could take while traveling.  Also discussed empiric trial of iron supplementation for motor restlessness.  Patient should avoid taking long naps as that can aggravate sleep quality.  Encourage sleeping on sides or elevating the head of the bed.  When patient returns from her travels she should follow-up with me " to review next steps such as further evaluation if home sleep apnea test was negative or starting treatment such as oral appliance therapy.  Please see patient instructions for further details of counseling provided.      55 minutes spent on the date of the encounter doing chart review, history and exam, documentation and further activities per the note    Yanci Steele M.D.  Pulmonary/Critical Care/Sleep Medicine    [facility]   [address]oor [address], [address]   [address] [place] [address]   Appointments: [phone]    The above note was dictated using voice recognition software and may include typographical errors. Please contact the author for any clarifications.

## 2021-12-09 NOTE — LETTER
12/9/2021         RE: Iman Lee  [address]  [facility] [id number]        Dear Colleague,    Thank you for referring your patient, Iman Lee, to the [facility]. Please see a copy of my visit note below.    Iman is a 56 year old who is being evaluated via a billable video visit.      How would you like to obtain your AVS? MyChart  If the video visit is dropped, the invitation should be resent by: Text to cell phone: [phone]  Will anyone else be joining your video visit? No      Video Start Time: 9:01 AM  Video-Visit Details    Type of service:  Video Visit    Video End Time:9:42 AM    Originating Location (pt. Location): Home    Distant Location (provider location):  [facility]     Platform used for Video Visit: [employer]       Chief complaint: Consultation requested by Rai Kebede for evaluation of snoring and poor sleep quality    History of Present Illness: 56-year-old female in good general health describes long history of poor quality sleep.  She has a history of sleep talking and sleepwalking in the past but not much recently.  She was evaluated many years ago but no treatments were recommended.  Snoring has developed in the last 4 years or so.  She has been told the snoring is loud and disruptive.  She also wakes herself up with a snort and mild shortness of breath.  She denies waking up with panic or palpitations.  She wakes up frequently at night and often will have difficulty returning to sleep.  This is improved in the last 5 years with the use of trazodone.  But she does suffer a lot of fatigue and sleepiness during the day.  This has been frustrating for her.      She typically goes to bed around 10-10 30 PM.  She does note that 2-3 times a week she experiences a sense of restlessness in her legs where she feels that walking helps.  She also notes this on long car rides.  When she wakes up in the middle  the night she denies significant restlessness.  She will often wake up around 2 and then 430 AM.  Sometimes she cannot fall back asleep and will just get up for the day around 430 otherwise she typically gets up by 6 AM.  She will take naps maybe 2-3 times a week.  She does wake up with headaches maybe once a week.  She often has dry mouth in the morning.  She usually drinks 2-3 caffeinated beverages in the first half of the day.  She will have 1 glass of wine maybe 3-4 times a week.  She is no longer experiencing hot flashes with estrogen supplementation.  She denies significant nocturia.  She is working from home.  Denies sleepiness behind the wheel.    Patient notes that she is hoping to get an evaluation for possible sleep apnea prior to leaving for 7 months.  She is leaving for [facility].    [place] Sleepiness Scale   Sitting and reading: Slight chance of dozing   Watching TV: Slight chance of dozing   Sitting, inactive in a public place (e.g. a theatre or a meeting): Slight chance of dozing   As a passenger in a car for an hour without a break: Slight chance of dozing   Lying down to rest in the afternoon when circumstances permit: High chance of dozing   Sitting and talking to someone: Slight chance of dozing   Sitting quietly after a lunch without alcohol: Moderate chance of dozing   In a car, while stopped for a few minutes in traffic: Would never doze   Total score - [place]: 10   (Less than 10 normal)    Insomnia Severity Scale  DANIEL Total Score: 17  Total score categories:  0-7 = No clinically significant insomnia   8-14 = Subthreshold insomnia   15-21 = Clinical insomnia (moderate severity)  22-28 = Clinical insomnia (severe)    STOP-BANG  Loud Snore   1  Excessively Tired/Sleepy   1  Observed apnea   0  Hypertension   0  BMI> 35 kg/m2   0  Age >50   1  Neck >16 in/40cm   0  Male Gender   0  Total =   3  (0-2 low, 3-4 intermediate, 5-8 high risk of ZEKE)      Past Medical History:   Diagnosis Date      Depressive disorder      Diverticulitis      IBS (irritable bowel syndrome)      Scoliosis        Allergies   Allergen Reactions     Erythromycin      Iodine      Penicillins      Percocet [Oxycodone-Acetaminophen]      Latex Rash       Current Outpatient Medications   Medication     albuterol (PROAIR HFA/PROVENTIL HFA/VENTOLIN HFA) 108 (90 Base) MCG/ACT inhaler     cetirizine (ZYRTEC) 10 MG tablet     citalopram (CELEXA) 20 MG tablet     dicyclomine (BENTYL) 10 MG capsule     estradiol (VAGIFEM) 10 MCG TABS vaginal tablet     IBUPROFEN PO     multivitamin w/minerals (THERA-VIT-M) tablet     omeprazole (PRILOSEC) 20 MG DR capsule     traZODone (DESYREL) 25 MG TABS     No current facility-administered medications for this visit.       Social History     Socioeconomic History     Marital status:      Spouse name: Not on file     Number of children: Not on file     Years of education: Not on file     Highest education level: Not on file   Occupational History     Not on file   Tobacco Use     Smoking status: Never Smoker     Smokeless tobacco: Never Used   Substance and Sexual Activity     Alcohol use: Yes     Alcohol/week: 4.0 standard drinks     Types: 4 Glasses of wine per week     Comment: 1 glass 3x4 times a week     Drug use: No     Sexual activity: Yes     Partners: Male     Birth control/protection: Female Surgical   Other Topics Concern     Parent/sibling w/ CABG, MI or angioplasty before 65F 55M? Not Asked   Social History Narrative     Not on file     Social Determinants of Health     Financial Resource Strain: Not on file   Food Insecurity: Not on file   Transportation Needs: Not on file   Physical Activity: Not on file   Stress: Not on file   Social Connections: Not on file   Intimate Partner Violence: Not on file   Housing Stability: Not on file       Family History   Problem Relation Age of Onset     Hypertension Mother      Osteoporosis Mother      Hyperlipidemia Mother      Sleep Apnea Mother   "    Hypertension Father      Hyperlipidemia Father      Sleep Apnea Father      No Known Problems Sister      No Known Problems Brother        Review of Systems:   Answers for HPI/ROS submitted by the patient on 12/8/2021  General Symptoms: No  Skin Symptoms: No  HENT Symptoms: No  EYE SYMPTOMS: No  HEART SYMPTOMS: No  LUNG SYMPTOMS: No  INTESTINAL SYMPTOMS: No  URINARY SYMPTOMS: No  GYNECOLOGIC SYMPTOMS: No  BREAST SYMPTOMS: No  SKELETAL SYMPTOMS: No  BLOOD SYMPTOMS: No  NERVOUS SYSTEM SYMPTOMS: No  MENTAL HEALTH SYMPTOMS: No        EXAM:  Ht 1.727 m (5' 8\")   Wt 77.1 kg (170 lb)   BMI 25.85 kg/m    GENERAL: Alert and no distress  EYES: Eyes grossly normal to inspection.  No discharge or erythema, or obvious scleral/conjunctival abnormalities.  RESP: No audible wheeze, cough, or visible cyanosis.  No visible retractions or increased work of breathing.    SKIN: Visible skin clear. No significant rash, abnormal pigmentation or lesions.  NEURO: Cranial nerves grossly intact.  Mentation and speech appropriate for age.  PSYCH: Mentation appears normal, affect normal, judgement and insight intact, normal speech and appearance well-groomed.       8/5/2021  TSH 1.26, Hb 13.7    ASSESSMENT:  56-year-old female with difficulty initiating and maintaining sleep somewhat improved on trazodone.  She also experiences some restless legs and snoring with arousals associated with snoring.  Possible that she has obstructive sleep apnea that could be contributing to her disruption and daytime sleepiness.  It is given mild sleep apnea is present and treatment could be beneficial for insomnia, sleep quality, motor restlessness.    PLAN:  Recommended home sleep apnea testing, will order a WatchPat as she may be able to get this completed before she leaves.  We talked about treatment options and this would be unlikely to be obtained before traveling.  However we discussed precautions that she could take while traveling.  Also discussed " empiric trial of iron supplementation for motor restlessness.  Patient should avoid taking long naps as that can aggravate sleep quality.  Encourage sleeping on sides or elevating the head of the bed.  When patient returns from her travels she should follow-up with me to review next steps such as further evaluation if home sleep apnea test was negative or starting treatment such as oral appliance therapy.  Please see patient instructions for further details of counseling provided.      55 minutes spent on the date of the encounter doing chart review, history and exam, documentation and further activities per the note    Yanci Steele M.D.  Pulmonary/Critical Care/Sleep Medicine    [facility]   [address]oor [address], [address]   [address] [place] [address]. [place] [address]   Appointments: [phone]    The above note was dictated using voice recognition software and may include typographical errors. Please contact the author for any clarifications.                Again, thank you for allowing me to participate in the care of your patient.        Sincerely,        Yanci Steele MD

## 2021-12-09 NOTE — PATIENT INSTRUCTIONS
"      MY TREATMENT INFORMATION FOR SLEEP APNEA-  Iman Lee    DOCTOR : Yanci Steele MD    Am I having a sleep study at a sleep center?   --->Due to insurance clearance delays, you will be contacted within 2-4 weeks to schedule    Am I having a home sleep study? yes  --->Watch the video for the device you are using:        -Disposable device sent out require phone/computer application-   https://www.[employer].com/watch?v=BCce_vbiwxE      Frequently asked questions:  1. What is Obstructive Sleep Apnea (ZEKE)? ZEKE is the most common type of sleep apnea. Apnea means, \"without breath.\"  Apnea is most often caused by narrowing or collapse of the upper airway as muscles relax during sleep.   Almost everyone has occasional apneas. Most people with sleep apnea have had brief interruptions at night frequently for many years.  The severity of sleep apnea is related to how frequent and severe the events are.   2. What are the consequences of ZEKE? Symptoms include: feeling sleepy during the day, snoring loudly, gasping or stopping of breathing, trouble sleeping, and occasionally morning headaches or heartburn at night.  Sleepiness can be serious and even increase the risk of falling asleep while driving. Other health consequences may include development of high blood pressure and other cardiovascular disease in persons who are susceptible. Untreated ZEKE  can contribute to heart disease, stroke and diabetes.   3. What are the treatment options? In most situations, sleep apnea is a lifelong disease that must be managed with daily therapy. Medications are not effective for sleep apnea and surgery is generally not considered until other therapies have been tried. Your treatment is your choice . Continuous Positive Airway (CPAP) works right away and is the therapy that is effective in nearly everyone. An oral device to hold your jaw forward is usually the next most reliable option. Other options include postioning " devices (to keep you off your back), weight loss, and surgery including a tongue pacing device. There is more detail about some of these options below.  4. Are my sleep studies covered by insurance? Although we will request verification of coverage, we advise you also check in advance of the study to ensure there is coverage.    Important tips for those choosing CPAP and similar devices   Know your equipment:  CPAP is continuous positive airway pressure that prevents obstructive sleep apnea by keeping the throat from collapsing while you are sleeping. In most cases, the device is  smart  and can slowly self-adjusts if your throat collapses and keeps a record every day of how well you are treated-this information is available to you and your care team.  BPAP is bilevel positive airway pressure that keeps your throat open and also assists each breath with a pressure boost to maintain adequate breathing.  Special kinds of BPAP are used in patients who have inadequate breathing from lung or heart disease. In most cases, the device is  smart  and can slowly self-adjusts to assist breathing. Like CPAP, the device keeps a record of how well you are treated.  Your mask is your connection to the device. You get to choose what feels most comfortable and the staff will help to make sure if fits. Here: are some examples of the different masks that are available:       Key points to remember on your journey with sleep apnea:  1. Sleep study.  PAP devices often need to be adjusted during a sleep study to show that they are effective and adjusted right.  2. Good tips to remember: Try wearing just the mask during a quiet time during the day so your body adapts to wearing it. A humidifier is recommended for comfort in most cases to prevent drying of your nose and throat. Allergy medication from your provider may help you if you are having nasal congestion.  3. Getting settled-in. It takes more than one night for most of us to get  used to wearing a mask. Try wearing just the mask during a quiet time during the day so your body adapts to wearing it. A humidifier is recommended for comfort in most cases. Our team will work with you carefully on the first day and will be in contact within 4 days and again at 2 and 4 weeks for advice and remote device adjustments. Your therapy is evaluated by the device each day.   4. Use it every night. The more you are able to sleep naturally for 7-8 hours, the more likely you will have good sleep and to prevent health risks or symptoms from sleep apnea. Even if you use it 4 hours it helps. Occasionally all of us are unable to use a medical therapy, in sleep apnea, it is not dangerous to miss one night.   5. Communicate. Call our skilled team on the number provided on the first day if your visit for problems that make it difficult to wear the device. Over 2 out of 3 patients can learn to wear the device long-term with help from our team. Remember to call our team or your sleep providers if you are unable to wear the device as we may have other solutions for those who cannot adapt to mask CPAP therapy. It is recommended that you sleep your sleep provider within the first 3 months and yearly after that if you are not having problems.   6. Use it for your health. We encourage use of CPAP masks during daytime quiet periods to allow your face and brain to adapt to the sensation of CPAP so that it will be a more natural sensation to awaken to at night or during naps. This can be very useful during the first few weeks or months of adapting to CPAP though it does not help medically to wear CPAP during wakefulness and  should not be used as a strategy just to meet guidelines.  7. Take care of your equipment. Make sure you clean your mask and tubing using directions every day and that your filter and mask are replaced as recommended or if they are not working.     BESIDES CPAP, WHAT OTHER THERAPIES ARE  THERE?    Positioning Device  Positioning devices are generally used when sleep apnea is mild and only occurs on your back.This example shows a pillow that straps around the waist. It may be appropriate for those whose sleep study shows milder sleep apnea that occurs primarily when lying flat on one's back. Preliminary studies have shown benefit but effectiveness at home may need to be verified by a home sleep test. These devices are generally not covered by medical insurance.  Examples of devices that maintain sleeping on the back to prevent snoring and mild sleep apnea.    Belt type body positioner  http://[employer]/    Electronic reminder  http://nightshifttherapy.com/  http://www.[employer].au/      Oral Appliance  What is oral appliance therapy?  An oral appliance device fits on your teeth at night like a retainer used after having braces. The device is made by a specialized dentist and requires several visits over 1-2 months before a manufactured device is made to fit your teeth and is adjusted to prevent your sleep apnea. Once an oral device is working properly, snoring should be improved. A home sleep test may be recommended at that time if to determine whether the sleep apnea is adequately treated.       Some things to remember:  -Oral devices are often, but not always, covered by your medical insurance. Be sure to check with your insurance provider.   -If you are referred for oral therapy, you will be given a list of specialized dentists to consider or you may choose to visit the Web site of the American Academy of Dental Sleep Medicine  -Oral devices are less likely to work if you have severe sleep apnea or are extremely overweight.     More detailed information  An oral appliance is a small acrylic device that fits over the upper and lower teeth  (similar to a retainer or a mouth guard). This device slightly moves jaw forward, which moves the base of the tongue forward, opens the airway, improves  breathing for effective treat snoring and obstructive sleep apnea in perhaps 7 out of 10 people .  The best working devices are custom-made by a dental device  after a mold is made of the teeth 1, 2, 3.  When is an oral appliance indicated?  Oral appliance therapy is recommended as a first-line treatment for patients with primary snoring, mild sleep apnea, and for patients with moderate sleep apnea who prefer appliance therapy to use of CPAP4, 5. Severity of sleep apnea is determined by sleep testing and is based on the number of respiratory events per hour of sleep.   How successful is oral appliance therapy?  The success rate of oral appliance therapy in patients with mild sleep apnea is 75-80% while in patients with moderate sleep apnea it is 50-70%. The chance of success in patients with severe sleep apnea is 40-50%. The research also shows that oral appliances have a beneficial effect on the cardiovascular health of ZEKE patients at the same magnitude as CPAP therapy7.  Oral appliances should be a second-line treatment in cases of severe sleep apnea, but if not completely successful then a combination therapy utilizing CPAP plus oral appliance therapy may be effective. Oral appliances tend to be effective in a broad range of patients although studies show that the patients who have the highest success are females, younger patients, those with milder disease, and less severe obesity. 3, 6.   Finding a dentist that practices dental sleep medicine  Specific training is available through the American Academy of Dental Sleep Medicine for dentists interested in working in the field of sleep. To find a dentist who is educated in the field of sleep and the use of oral appliances, near you, visit the Web site of the American Academy of Dental Sleep Medicine.    References  1. Guanako et al. Objectively measured vs self-reported compliance during oral appliance therapy for sleep-disordered breathing. Chest  2013; 144(5): 5329-2293.  2. Charley, et al. Objective measurement of compliance during oral appliance therapy for sleep-disordered breathing. Thorax 2013; 68(1): 91-96.  3. Moises et al. Mandibular advancement devices in 620 men and women with ZEKE and snoring: tolerability and predictors of treatment success. Chest 2004; 125: 4677-8163.  4. Kellee et al. Oral appliances for snoring and ZEKE: a review. Sleep 2006; 29: 244-262.  5. Araseli et al. Oral appliance treatment for ZEKE: an update. J Clin Sleep Med 2014; 10(2): 215-227.  6. Charan et al. Predictors of OSAH treatment outcome. J Dent Res 2007; 86: 2005-6001.      Weight Loss:    Weight loss is a long-term strategy that may improve sleep apnea in some patients.    Weight management is a personal decision and the decision should be based on your interest and the potential benefits.  If you are interested in exploring weight loss strategies, the following discussion covers the impact on weight loss on sleep apnea and the approaches that may be successful.    Being overweight does not necessarily mean you will have health consequences.  Those who have BMI over 35 or over 27 with existing medical conditions carries greater risk.   Weight loss decreases severity of sleep apnea in most people with obesity. For those with mild obesity who have developed snoring with weight gain, even 15-30 pound weight loss can improve and occasionally eliminate sleep apnea.  Structured and life-long dietary and health habits are necessary to lose weight and keep healthier weight levels.     Though there may be significant health benefits from weight loss, long-term weight loss is very difficult to achieve- studies show success with dietary management in less than 10% of people. In addition, substantial weight loss may require years of dietary control and may be difficult if patients have severe obesity. In these cases, surgical management may be considered.  Finally,  older individuals who have tolerated obesity without health complications may be less likely to benefit from weight loss strategies.        Your BMI is Body mass index is 25.85 kg/m .  Weight management is a personal decision.  If you are interested in exploring weight loss strategies, the following discussion covers the approaches that may be successful. Body mass index (BMI) is one way to tell whether you are at a healthy weight, overweight, or obese. It measures your weight in relation to your height.  A BMI of 18.5 to 24.9 is in the healthy range. A person with a BMI of 25 to 29.9 is considered overweight, and someone with a BMI of 30 or greater is considered obese. More than two-thirds of American adults are considered overweight or obese.  Being overweight or obese increases the risk for further weight gain. Excess weight may lead to heart disease and diabetes.  Creating and following plans for healthy eating and physical activity may help you improve your health.  Weight control is part of healthy lifestyle and includes exercise, emotional health, and healthy eating habits. Careful eating habits lifelong are the mainstay of weight control. Though there are significant health benefits from weight loss, long-term weight loss with diet alone may be very difficult to achieve- studies show long-term success with dietary management in less than 10% of people. Attaining a healthy weight may be especially difficult to achieve in those with severe obesity. In some cases, medications, devices and surgical management might be considered.  What can you do?  If you are overweight or obese and are interested in methods for weight loss, you should discuss this with your provider.     Consider reducing daily calorie intake by 500 calories.     Keep a food journal.     Avoiding skipping meals, consider cutting portions instead.    Diet combined with exercise helps maintain muscle while optimizing fat loss. Strength training  is particularly important for building and maintaining muscle mass. Exercise helps reduce stress, increase energy, and improves fitness. Increasing exercise without diet control, however, may not burn enough calories to loose weight.       Start walking three days a week 10-20 minutes at a time    Work towards walking thirty minutes five days a week     Eventually, increase the speed of your walking for 1-2 minutes at time    In addition, we recommend that you review healthy lifestyles and methods for weight loss available through the National Institutes of Health patient information sites:  http://win.niddk.nih.gov/publications/index.htm    And look into health and wellness programs that may be available through your health insurance provider, employer, local community center, or jerald club.          Surgery:    Surgery for obstructive sleep apnea is considered generally only when other therapies fail to work. Surgery may be discussed with you if you are having a difficult time tolerating CPAP and or when there is an abnormal structure that requires surgical correction.  Nose and throat surgeries often enlarge the airway to prevent collapse.  Most of these surgeries create pain for 1-2 weeks and up to half of the most common surgeries are not effective throughout life.  You should carefully discuss the benefits and drawbacks to surgery with your sleep provider and surgeon to determine if it is the best solution for you.   More information  Surgery for ZEKE is directed at areas that are responsible for narrowing or complete obstruction of the airway during sleep.  There are a wide range of procedures available to enlarge and/or stabilize the airway to prevent blockage of breathing in the three major areas where it can occur: the palate, tongue, and nasal regions.  Successful surgical treatment depends on the accurate identification of the factors responsible for obstructive sleep apnea in each person.  A personalized  approach is required because there is no single treatment that works well for everyone.  Because of anatomic variation, consultation with an examination by a sleep surgeon is a critical first step in determining what surgical options are best for each patient.  In some cases, examination during sedation may be recommended in order to guide the selection of procedures.  Patients will be counseled about risks and benefits as well as the typical recovery course after surgery. Surgery is typically not a cure for a person s ZEKE.  However, surgery will often significantly improve one s ZEKE severity (termed  success rate ).  Even in the absence of a cure, surgery will decrease the cardiovascular risk associated with OSA7; improve overall quality of life8 (sleepiness, functionality, sleep quality, etc).      Palate Procedures:  Patients with ZEKE often have narrowing of their airway in the region of their tonsils and uvula.  The goals of palate procedures are to widen the airway in this region as well as to help the tissues resist collapse.  Modern palate procedure techniques focus on tissue conservation and soft tissue rearrangement, rather than tissue removal.  Often the uvula is preserved in this procedure. Residual sleep apnea is common in patient after pharyngoplasty with an average reduction in sleep apnea events of 33%2.      Tongue Procedures:  ExamWhile patients are awake, the muscles that surround the throat are active and keep this region open for breathing. These muscles relax during sleep, allowing the tongue and other structures to collapse and block breathing.  There are several different tongue procedures available.  Selection of a tongue base procedure depends on characteristics seen on physical exam.  Generally, procedures are aimed at removing bulky tissues in this area or preventing the back of the tongue from falling back during sleep.  Success rates for tongue surgery range from 50-62%3.    Hypoglossal  Nerve Stimulation:  Hypoglossal nerve stimulation has recently received approval from the [place] Food and Drug Administration for the treatment of obstructive sleep apnea.  This is based on research showing that the system was safe and effective in treating sleep apnea6.  Results showed that the median AHI score decreased 68%, from 29.3 to 9.0. This therapy uses an implant system that senses breathing patterns and delivers mild stimulation to airway muscles, which keeps the airway open during sleep.  The system consists of three fully implanted components: a small generator (similar in size to a pacemaker), a breathing sensor, and a stimulation lead.  Using a small handheld remote, a patient turns the therapy on before bed and off upon awakening.    Candidates for this device must be greater than 22 years of age, have moderate to severe ZEKE (AHI between 20-65), BMI less than 32, have tried CPAP/oral appliance without success, and have appropriate upper airway anatomy (determined by a sleep endoscopy performed by Dr. Daniels).    Hypoglossal Nerve Stimulation Pathway:    The sleep surgeon s office will work with the patient through the insurance prior-authorization process (including communications and appeals).    Nasal Procedures:  Nasal obstruction can interfere with nasal breathing during the day and night.  Studies have shown that relief of nasal obstruction can improve the ability of some patients to tolerate positive airway pressure therapy for obstructive sleep apnea1.  Treatment options include medications such as nasal saline, topical corticosteroid and antihistamine sprays, and oral medications such as antihistamines or decongestants. Non-surgical treatments can include external nasal dilators for selected patients. If these are not successful by themselves, surgery can improve the nasal airway either alone or in combination with these other options.      Combination Procedures:  Combination of  surgical procedures and other treatments may be recommended, particularly if patients have more than one area of narrowing or persistent positional disease.  The success rate of combination surgery ranges from 66-80%2,3.    References  1. Arlene MALDONADO. The Role of the Nose in Snoring and Obstructive Sleep Apnoea: An Update.  Eur Arch Otorhinolaryngol. 2011; 268: 1365-73.  2.  Yumiko SM; William JA; Santana JR; Pallanch JF; Alvarado MB; Larry SG; Migdalia BHATT. Surgical modifications of the upper airway for obstructive sleep apnea in adults: a systematic review and meta-analysis. SLEEP 2010;33(10):6237-4094. Slim MACIAS. Hypopharyngeal surgery in obstructive sleep apnea: an evidence-based medicine review.  Arch Otolaryngol Head Neck Surg. 2006 Feb;132(2):206-13.  3. Garrett YH1, Theresa Y, Ivan SALOMON. The efficacy of anatomically based multilevel surgery for obstructive sleep apnea. Otolaryngol Head Neck Surg. 2003 Oct;129(4):327-35.  4. Kezirian E, Goldberg A. Hypopharyngeal Surgery in Obstructive Sleep Apnea: An Evidence-Based Medicine Review. Arch Otolaryngol Head Neck Surg. 2006 Feb;132(2):206-13.  5. Sagrario TRINIDAD et al. Upper-Airway Stimulation for Obstructive Sleep Apnea.  N Engl J Med. 2014 Jan 9;370(2):139-49.  6. Frederick Y et al. Increased Incidence of Cardiovascular Disease in Middle-aged Men with Obstructive Sleep Apnea. Am J Respir Crit Care Med; 2002 166: 159-165  7. Samuel EM et al. Studying Life Effects and Effectiveness of Palatopharyngoplasty (SLEEP) study: Subjective Outcomes of Isolated Uvulopalatopharyngoplasty. Otolaryngol Head Neck Surg. 2011; 144: 623-631.        WHAT IF I ONLY HAVE SNORING?      Mandibular advancement devices, lateral sleep positioning, long-term weight loss and treatment of nasal allergies has been shown to improve snoring.    Exercising tongue muscles with a game (https://www.ncbi.nlm.nih.gov/pubmed/02626827) or stimulating the tongue during the day with a device  (https://doi.org/10.3390/qyj56628782) have t snoring    Remember to Drive Safe... Drive Alive     Sleep health profoundly affects your health, mood, and your safety.  Thirty three percent of the population (one in three of us) is not getting enough sleep and many have a sleep disorder. Not getting enough sleep or having an untreated / undertreated sleep condition may make us sleepy without even knowing it. In fact, our driving could be dramatically impaired due to our sleep health. As your provider, here are some things I would like you to know about driving:     Here are some warning signs for impairment and dangerous drowsy driving:              -Having been awake more than 16 hours               -Looking tired               -Eyelid drooping              -Head nodding (it could be too late at this point)              -Driving for more than 30 minutes     Some things you could do to make the driving safer if you are experiencing some drowsiness:              -Stop driving and rest              -Call for transportation              -Make sure your sleep disorder is adequately treated     Some things that have been shown NOT to work when experiencing drowsiness while driving:              -Turning on the radio              -Opening windows              -Eating any  distracting  /  entertaining  foods (e.g., sunflower seeds, candy, or any other)              -Talking on the phone      Your decision may not only impact your life, but also the life of others. Please, remember to drive safe for yourself and all of us.      Insomnia - Restless Leg Syndrome (RLS)  Based on the information that you provided today you are likely to have restless leg syndrome that may be interfering with your sleep.  Treatment of restless leg syndrome usually depends on how much it is bothering you.  If it is a major problem then you might want to do something about it.  Here are some treatment options that you might want to consider:        Behavior Changes:     - Reduce or eliminate caffeine and alcohol products     - Increase the amount of stretching that you do, particularly before bedtime     - Sometimes a leg message can be helpful     - Some people find that a warm bath or shower in the evening is helpful     Start oral iron with vitamin C or orange juice

## 2021-12-20 NOTE — TELEPHONE ENCOUNTER
----- Message from Sienna Cannon sent at 12/10/2021  9:03 AM CST -----  Regarding: PedidosYa / PedidosJÃ¡t

## 2021-12-22 ENCOUNTER — OFFICE VISIT (OUTPATIENT)
Dept: SLEEP MEDICINE | Facility: CLINIC | Age: 56
End: 2021-12-22
Attending: INTERNAL MEDICINE
Payer: COMMERCIAL

## 2021-12-22 DIAGNOSIS — G47.09 OTHER INSOMNIA: ICD-10-CM

## 2021-12-22 DIAGNOSIS — R06.83 SNORING: ICD-10-CM

## 2021-12-22 DIAGNOSIS — G25.81 RESTLESS LEGS SYNDROME (RLS): ICD-10-CM

## 2021-12-22 PROCEDURE — 95800 SLP STDY UNATTENDED: CPT | Performed by: INTERNAL MEDICINE

## 2021-12-22 NOTE — PROGRESS NOTES
Device has been registered and shipped via [employer] on 12/22/21. Patient was notified that package was mailed out. Watch [facility] serial number [id number].

## 2021-12-27 NOTE — PROGRESS NOTES
Watch pat has been scored using rule 1B, 4%.   Patient to follow up with provider to determine appropriate therapy.     Pat AHI: 5.9    Ordering Provider: Dr. Cedric Richards, [facility], [facility]  Sleep Technologist  12/27/21

## 2021-12-28 NOTE — PROCEDURES
"WatchPAT - HOME SLEEP STUDY INTERPRETATION    Patient: Iman Lee  MRN: [id number]  YOB: 1965  Study Date: 12/22/2021  Referring Provider: Rai Kebede MD  Ordering Provider: Yanci Steele MD    Chain of custody patient verification was not enabled.       Indications for Home Study: Iman Lee is a 56 year old female with a history of insomnia, depression who presents with symptoms suggestive of obstructive sleep apnea.    Estimated body mass index is 25.85 kg/m  as calculated from the following:    Height as of 12/9/21: 1.727 m (5' 8\").    Weight as of 12/9/21: 77.1 kg (170 lb).  Total score - [place]: 10 (12/8/2021  2:18 PM)  StopBang Total Score: 3 (12/9/2021  9:00 AM)    Data: A full night home sleep study was performed recording the standard physiologic parameters including peripheral arterial tonometry (PAT), sound/snoring, body position,  movement, sound, and oxygen saturation by pulse oximetry. Pulse rate was estimated by oximetry recording. Sleep staging (wake, REM, light, and deep sleep) was derived from PAT signal.  This study was considered inadequate based on > 4 hours of quality oximetry and respiratory recording. As specified by the AASM Manual for the Scoring of Sleep and Associated events, version 2.3, Rule VIII.D 1B, 4% oxygen desaturation scoring for hypopneas is used as a standard of care on all home sleep apnea testing.    Total Recording Time: 7 hrs, 40 min  Total Sleep Time: 6 hrs, 47 min  % of Sleep Time REM: 33%    Respiratory:  Snoring: Snoring was present, intermittent.  Respiratory events: The PAT respiratory disturbance index [pRDI] was 8.5 events per hour.  The PAT apnea/hypopnea index [pAHI] was 6.0 events per hour.  KEILA was 4.4 events per hour.  During REM sleep the pAHI was 6.6.  Sleep Associated Hypoxemia: sustained hypoxemia was not present. Mean oxygen saturation was 93%.  Minimum was 88%.  Time with saturation less than 88% was 0 " minutes.    Heart Rate: By pulse oximetry normal rate was noted.     Position: Percent of time spent: supine - 58%, prone - 0%, on right - 16%, on left - 26%.  pAHI was 8.3 per hour supine, NA per hour prone, 5.2 per hour on right side, and 1.1 per hour on left side.     Assessment:   Mild obstructive sleep apnea.  Sleep associated hypoxemia was not present.    Recommendations:  If treatment of mild sleep apnea clinically indicated, consider auto-CPAP at 5-15 cmH2O or oral appliance therapy.  Suggest optimizing sleep hygiene and avoiding sleep deprivation.  Weight management.    Diagnosis Code(s): Obstructive Sleep Apnea G47.33    Yanci Steele MD, December 28, 2021   Diplomate, American Board of Internal Medicine, Sleep Medicine

## 2021-12-31 PROBLEM — R33.9 INCOMPLETE BLADDER EMPTYING: Status: RESOLVED | Noted: 2021-05-13 | Resolved: 2021-12-31

## 2021-12-31 PROBLEM — M62.89 PELVIC FLOOR DYSFUNCTION: Status: RESOLVED | Noted: 2021-05-13 | Resolved: 2021-12-31

## 2022-02-19 ENCOUNTER — HEALTH MAINTENANCE LETTER (OUTPATIENT)
Age: 57
End: 2022-02-19

## 2022-10-22 ENCOUNTER — HEALTH MAINTENANCE LETTER (OUTPATIENT)
Age: 57
End: 2022-10-22

## 2023-04-01 ENCOUNTER — HEALTH MAINTENANCE LETTER (OUTPATIENT)
Age: 58
End: 2023-04-01

## 2023-04-10 PROCEDURE — 87070 CULTURE OTHR SPECIMN AEROBIC: CPT | Mod: ORL | Performed by: DERMATOLOGY

## 2023-05-04 ENCOUNTER — OFFICE VISIT (OUTPATIENT)
Dept: SLEEP MEDICINE | Facility: CLINIC | Age: 58
End: 2023-05-04
Payer: COMMERCIAL

## 2023-05-04 VITALS
WEIGHT: 178.9 LBS | HEART RATE: 79 BPM | BODY MASS INDEX: 27.2 KG/M2 | SYSTOLIC BLOOD PRESSURE: 124 MMHG | OXYGEN SATURATION: 97 % | DIASTOLIC BLOOD PRESSURE: 82 MMHG | RESPIRATION RATE: 16 BRPM

## 2023-05-04 DIAGNOSIS — G47.33 OSA (OBSTRUCTIVE SLEEP APNEA): Primary | ICD-10-CM

## 2023-05-04 DIAGNOSIS — G25.81 RESTLESS LEGS SYNDROME (RLS): ICD-10-CM

## 2023-05-04 DIAGNOSIS — R06.83 SNORING: ICD-10-CM

## 2023-05-04 DIAGNOSIS — G47.00 INSOMNIA, UNSPECIFIED TYPE: ICD-10-CM

## 2023-05-04 PROCEDURE — 99213 OFFICE O/P EST LOW 20 MIN: CPT | Performed by: INTERNAL MEDICINE

## 2023-05-04 RX ORDER — ESTRADIOL 0.07 MG/D
FILM, EXTENDED RELEASE TRANSDERMAL
COMMUNITY
Start: 2023-02-27

## 2023-05-04 NOTE — PATIENT INSTRUCTIONS
[facility]  Search engine: https://mms.aadsm.org/members/directory/search_bootstrap.php?org_id=ADSM&   Certified in Dental Sleep Medicine    Kris Almonte  Degree: DDS  [address]  [address]  [place] [address]  Professional Phone: [phone]  Website: [email or web address]    Paulie Cooper  Degree: DDS  [facility]  [address]  [address]  [place] [address]  Professional Phone: [phone]: [phone]    Neda Carr  [facility]  [address]  [place] [address]  Professional Phone: [phone]  Website: [email or web address]      Lamont Grimaldo  Degree: DDS  [address]  [address]  [place] [address]  Professional Phone: [phone]  Fax: [phone]    Toribio Boland  Degree: DDS  [facility]  [address]  [place] [address]  Professional Phone: [phone]  Website: [email or web address]      [facility]-you should verify insurance coverage  [address]  [address]  [place] [address]  Professional Phone: [phone]  Website: [email or web address]      Delicia Carrasquillo--DOES NOT ACCEPT INSURANCE  Degree: DDS--you should verify insurance coverage  [facility]  [address]  [address]  [place] [address]  Professional Phone: [phone]     Teresa Ruiz  Degree: DDS, PhD  [facility]  [address] [place] [address] [address] [place] [address]   [address]   [address]   [place] [address]   Appointments: [phone]   Fax: [phone]   [facility]   [address] [place] [address]   [address]   [place] [address]   Appointments: [phone]   Fax: [phone]                Von  Joslyn  Degree: ANTIONE  [address] [place] [address]  Professional Phone: [phone]  Fax: [phone]  Website: [email or web address]      Segundo Fregoso  Degree: ANTIONE  [facility]  [address] [place] [address]    Mariona Mulet Pradera  Degree: MS ANTIONE  [facility]  [address] [place] [address]  Professional Phone: [phone]      Dimple Casanova  Degree: MS ANTIONE  [facility]  [address]  [address]  [place] [address]  Professional Phone: [phone]    Phoebe Suarez  Degree: ANTIONE  [facility]  [address]  [address]  [place] [address]  Advance Office     Bk Hassan  Degree: ANTIONE  [facility]  [address] [place] [address]  Professional Phone: [phone]  Website: [email or web address]      Arun Nielson  Degree: ANTIONE  [facility]  [address] [place] [address]  Professional Phone: [phone]  Fax: [phone]      Braulio Christy  Degree: ANTIONE [facility]  [address]  [address]  [place] [address]                 ACCEPT MEDICARE  Jaylen Valencia DDS  [address] [facility] [address] [facility] [address] [facility], [place] [address]  [phone]; [phone] (fax)  [facility]    Yevgeniy Lepe DDS, MS   [facility]   [address]   [address]   [place] [address]   Appointments: [phone]   Fax: [phone]     Froy Jorgensen DDS   [address]   [place] [address]  Appointments [phone]      ADDITIONAL PROVIDERS    Amador Jimenez DDS   [facility]   [address]   [address]   [place] [address]   Appointments: [phone]   Fax: [phone]       Hira Baum DDS, [facility] [address] [address]   Appointments: [phone] Ext: [phone]  Fax: [phone]   [email or web address]              Oral Appliance  What is oral appliance therapy?  An oral appliance device fits on your teeth at night like a retainer used after having braces. The device is made by a specialized dentist and requires several visits over 1-2 months before a manufactured device is made to fit your teeth and is adjusted to prevent your sleep apnea. Once an oral device is working properly, snoring should be improved. A home sleep test may be recommended at that time if to determine whether the sleep apnea is adequately treated.       Some things to remember:  -Oral devices are often, but not always, covered by your medical insurance. Be sure to check with your insurance provider.   -If you are referred for oral therapy, you will be given a list of specialized dentists to consider or you may choose to visit the Web site of the American Academy of Dental Sleep Medicine  -Oral devices are less likely to work if you have severe sleep apnea or are extremely overweight.     More detailed information  An oral appliance is a small acrylic device that fits over the upper and lower teeth  (similar to a retainer or a mouth guard). This device slightly moves jaw forward, which moves the base of the tongue forward, opens the airway, improves breathing for effective treat snoring and obstructive sleep apnea in perhaps 7 out of 10 people .  The best working devices are custom-made by a dental device  after a mold is made of the teeth 1, 2, 3.  When is an oral appliance indicated?  Oral appliance therapy is recommended as a first-line treatment for patients with primary snoring, mild sleep apnea, and for patients with moderate sleep apnea who prefer appliance therapy to use of CPAP4, 5. Severity of sleep apnea is determined by sleep testing and is based on the number of respiratory events per hour of sleep.   How successful is oral appliance therapy?  The success rate of oral appliance therapy in patients with mild sleep apnea  is 75-80% while in patients with moderate sleep apnea it is 50-70%. The chance of success in patients with severe sleep apnea is 40-50%. The research also shows that oral appliances have a beneficial effect on the cardiovascular health of ZEKE patients at the same magnitude as CPAP therapy7.  Oral appliances should be a second-line treatment in cases of severe sleep apnea, but if not completely successful then a combination therapy utilizing CPAP plus oral appliance therapy may be effective. Oral appliances tend to be effective in a broad range of patients although studies show that the patients who have the highest success are females, younger patients, those with milder disease, and less severe obesity. 3, 6.   Finding a dentist that practices dental sleep medicine  Specific training is available through the American Academy of Dental Sleep Medicine for dentists interested in working in the field of sleep. To find a dentist who is educated in the field of sleep and the use of oral appliances, near you, visit the Web site of the American Academy of Dental Sleep Medicine.    References  1. Guanako, et al. Objectively measured vs self-reported compliance during oral appliance therapy for sleep-disordered breathing. Chest 2013; 144(5): 9538-1675.  2. Charley, et al. Objective measurement of compliance during oral appliance therapy for sleep-disordered breathing. Thorax 2013; 68(1): 91-96.  3. Moises et al. Mandibular advancement devices in 620 men and women with ZEKE and snoring: tolerability and predictors of treatment success. Chest 2004; 125: 9587-6592.  4. Kellee, et al. Oral appliances for snoring and ZEKE: a review. Sleep 2006; 29: 244-262.  5. Araseli et al. Oral appliance treatment for ZEKE: an update. J Clin Sleep Med 2014; 10(2): 215-227.  6. Charan, et al. Predictors of OSAH treatment outcome. J Dent Res 2007; 86: 5933-3950.          WHAT IF I ONLY HAVE SNORING?    Mandibular advancement  devices, lateral sleep positioning, long-term weight loss and treatment of nasal allergies have been shown to improve snoring.  Exercising tongue muscles with a game (https://apps.[employer].Tamtron/us/omayra/soundly-reduce-snoring/va0008277635) or stimulating the tongue during the day with a device (https://doi.org/10.3390/nrh54654037) have improved snoring in some individuals.

## 2023-05-04 NOTE — NURSING NOTE
Dental referral information has been given to pt.  Pt will call back to schedule 6 month follow up.    GANESH Davidson

## 2023-05-04 NOTE — PROGRESS NOTES
Chief complaint: Follow-up sleep study    LOV12/9/2021    History of Present Illness: 57-year-old female with history of depression, insomnia on trazodone, snoring and restless legs.  She underwent a sleep test in late 2021.  She is here to discuss the results and next steps.  Since that time she has had a little bit of weight gain and some progression in her symptoms.  She continues to wake herself up snoring most nights.  Others complained about her snoring as well.  She sleeps on her back and sides but cannot sleep exclusively on her sides due to hip pain.  Typical bedtime is around 10:30 she takes trazodone at that time.  With trazodone she denies difficulty falling asleep.  She does wake up multiple times at night but can return to sleep okay.  She often will wake up as early as 5 AM.  She has motor restlessness that is been affecting her sleep maybe 1 night a week.  She has noticed improvement since starting iron supplementation.  She does drink about 3 caffeinated beverages a day.  She has a history of sleep talking as well but no sleepwalking of late.  She denies problems with nocturia.    Her dental health care is up-to-date.  She denies jaw pain.    [place] Sleepiness Scale  Total score - [place]: 5 (5/4/2023  8:25 AM)   (Less than 10 normal)    Insomnia Severity Scale  DANIEL Total Score: 12  (normal 0-7, mild 8-14, moderate 15-21, severe 22-28)    Past Medical History:   Diagnosis Date     Depressive disorder      Diverticulitis      IBS (irritable bowel syndrome)      Scoliosis        Allergies   Allergen Reactions     Erythromycin      Iodine      Penicillins      Percocet [Oxycodone-Acetaminophen]      Latex Rash       Current Outpatient Medications   Medication     albuterol (PROAIR HFA/PROVENTIL HFA/VENTOLIN HFA) 108 (90 Base) MCG/ACT inhaler     cetirizine (ZYRTEC) 10 MG tablet     citalopram (CELEXA) 20 MG tablet     estradiol (VIVELLE-DOT) 0.075 MG/24HR BIW patch     IBUPROFEN PO     multivitamin  w/minerals (THERA-VIT-M) tablet     traZODone (DESYREL) 25 MG TABS     No current facility-administered medications for this visit.       Social History     Socioeconomic History     Marital status:      Spouse name: Not on file     Number of children: Not on file     Years of education: Not on file     Highest education level: Not on file   Occupational History     Not on file   Tobacco Use     Smoking status: Never     Smokeless tobacco: Never   Vaping Use     Vaping status: Not on file   Substance and Sexual Activity     Alcohol use: Yes     Alcohol/week: 4.0 standard drinks of alcohol     Types: 4 Glasses of wine per week     Comment: 1 glass 3x4 times a week     Drug use: No     Sexual activity: Yes     Partners: Male     Birth control/protection: Female Surgical   Other Topics Concern     Parent/sibling w/ CABG, MI or angioplasty before 65F 55M? Not Asked   Social History Narrative     Not on file     Social Determinants of Health     Financial Resource Strain: Not on file   Food Insecurity: Not on file   Transportation Needs: Not on file   Physical Activity: Not on file   Stress: Not on file   Social Connections: Not on file   Intimate Partner Violence: Not on file   Housing Stability: Not on file       Family History   Problem Relation Age of Onset     Hypertension Mother      Osteoporosis Mother      Hyperlipidemia Mother      Sleep Apnea Mother      Hypertension Father      Hyperlipidemia Father      Sleep Apnea Father      No Known Problems Sister      No Known Problems Brother            EXAM:  /82   Pulse 79   Resp 16   Wt 81.1 kg (178 lb 14.4 oz)   SpO2 97%   BMI 27.20 kg/m    GENERAL: Alert and no distress  EYES: Eyes grossly normal to inspection.  No discharge or erythema, or obvious scleral/conjunctival abnormalities.  RESP: No audible wheeze, cough, or visible cyanosis.  No visible retractions or increased work of breathing.    SKIN: Visible skin clear. No significant rash,  abnormal pigmentation or lesions.  NEURO: Cranial nerves grossly intact.  Mentation and speech appropriate for age.  PSYCH: Mentation appears normal, affect normal, judgement and insight intact, normal speech and appearance well-groomed.       HSAT WatchPat One 12/22/2021  Weight 170 lbs BMI 25.85  pAHI 6, pRDI 8.5, Lowest O2 Sat 88%    No results found for: TSH    ASSESSMENT:  57-year-old female with depression, insomnia on trazodone, motor restlessness improved on iron therapy, snoring, snort arousals, and overall mild obstructive sleep apnea.  She had a little bit of weight gain since her sleep test.  I do not think a repeat sleep study is necessarily indicated.  Treatment trial with could be beneficial to improve sleep consolidation and daytime fatigue.    PLAN:  We reviewed treatment options including CPAP and oral appliances.  She is not a good candidate for positional therapy due to hip pain.  Weight loss is encouraged.  Plan to proceed with a trial of oral appliance therapy.  This was reviewed with her in detail today.  She should follow-up with me in 6 months.  At that time reassess effectiveness.  However do not necessarily need to have a repeat sleep test to confirm adequacy if she is having a good clinical response.  Patient was also provided information about an omayra to improve upper airway strength and reduce snoring..    25 minutes spent by me on the date of the encounter doing chart review, history and exam, documentation and further activities per the note    Yanci Steele M.D.  Pulmonary/Critical Care/Sleep Medicine    [facility]   [address], [address]   [address] [place] [address]   Appointments: [phone]    The above note was dictated using voice recognition software and may include typographical errors. Please contact the author for any clarifications.

## 2023-05-19 ENCOUNTER — LAB REQUISITION (OUTPATIENT)
Dept: LAB | Facility: CLINIC | Age: 58
End: 2023-05-19
Payer: COMMERCIAL

## 2023-05-19 DIAGNOSIS — L30.8 OTHER SPECIFIED DERMATITIS: ICD-10-CM

## 2023-05-19 PROCEDURE — 87070 CULTURE OTHR SPECIMN AEROBIC: CPT | Mod: ORL | Performed by: DERMATOLOGY

## 2023-05-21 LAB
BACTERIA SPEC CULT: NORMAL
BACTERIA SPEC CULT: NORMAL

## 2024-01-14 ENCOUNTER — HEALTH MAINTENANCE LETTER (OUTPATIENT)
Age: 59
End: 2024-01-14

## 2025-01-26 ENCOUNTER — HEALTH MAINTENANCE LETTER (OUTPATIENT)
Age: 60
End: 2025-01-26

## (undated) RX ORDER — TRIAMCINOLONE ACETONIDE 40 MG/ML
INJECTION, SUSPENSION INTRA-ARTICULAR; INTRAMUSCULAR
Status: DISPENSED
Start: 2017-12-14

## (undated) RX ORDER — LIDOCAINE HYDROCHLORIDE 10 MG/ML
INJECTION, SOLUTION INFILTRATION; PERINEURAL
Status: DISPENSED
Start: 2017-12-14

## (undated) RX ORDER — FENTANYL CITRATE 50 UG/ML
INJECTION, SOLUTION INTRAMUSCULAR; INTRAVENOUS
Status: DISPENSED
Start: 2019-05-15